# Patient Record
Sex: MALE | Race: WHITE | NOT HISPANIC OR LATINO | Employment: FULL TIME | URBAN - METROPOLITAN AREA
[De-identification: names, ages, dates, MRNs, and addresses within clinical notes are randomized per-mention and may not be internally consistent; named-entity substitution may affect disease eponyms.]

---

## 2017-11-14 ENCOUNTER — TELEPHONE (OUTPATIENT)
Dept: FAMILY MEDICINE CLINIC | Facility: CLINIC | Age: 47
End: 2017-11-14

## 2017-11-14 NOTE — TELEPHONE ENCOUNTER
----- Message from Cole Sanabria MD sent at 11/13/2017  1:35 PM EST -----  That's fine. Thx    ----- Message -----     From: Florence Mccauley     Sent: 11/13/2017   1:22 PM       To: Cole Sanabria MD    Pt would like to transfer to you from the [facility] location. His doctor retired.     Pt #: [phone]

## 2018-03-14 ENCOUNTER — OFFICE VISIT (OUTPATIENT)
Dept: FAMILY MEDICINE CLINIC | Facility: CLINIC | Age: 48
End: 2018-03-14

## 2018-03-14 VITALS
OXYGEN SATURATION: 97 % | TEMPERATURE: 98.5 F | HEART RATE: 84 BPM | SYSTOLIC BLOOD PRESSURE: 138 MMHG | DIASTOLIC BLOOD PRESSURE: 72 MMHG | BODY MASS INDEX: 37.08 KG/M2 | WEIGHT: 273.8 LBS | HEIGHT: 72 IN

## 2018-03-14 DIAGNOSIS — Z12.5 SCREENING PSA (PROSTATE SPECIFIC ANTIGEN): ICD-10-CM

## 2018-03-14 DIAGNOSIS — Z00.00 HEALTH CARE MAINTENANCE: Primary | ICD-10-CM

## 2018-03-14 PROBLEM — I47.1 SUPRAVENTRICULAR TACHYCARDIA (HCC): Status: ACTIVE | Noted: 2018-03-14

## 2018-03-14 PROBLEM — I47.10 SUPRAVENTRICULAR TACHYCARDIA: Status: ACTIVE | Noted: 2018-03-14

## 2018-03-14 PROBLEM — E66.9 OBESITY: Status: ACTIVE | Noted: 2018-03-14

## 2018-03-14 PROCEDURE — 99396 PREV VISIT EST AGE 40-64: CPT | Performed by: FAMILY MEDICINE

## 2018-03-14 RX ORDER — CETIRIZINE HYDROCHLORIDE 10 MG/1
10 TABLET ORAL DAILY
COMMUNITY
End: 2021-10-19

## 2018-03-14 RX ORDER — MAG HYDROX/ALUMINUM HYD/SIMETH 400-400-40
1 SUSPENSION, ORAL (FINAL DOSE FORM) ORAL DAILY
COMMUNITY
Start: 2015-11-04

## 2018-03-14 NOTE — PROGRESS NOTES
Subjective   Vinnie Ventura is a 48 y.o. male.     Chief Complaint   Patient presents with   • Establish Care     New pt  pt is not fasting   • Rapid Heart Rate   • Heartburn        History of Present Illness    New patient this practice.  Here to get established.  Also annual complete physical examination.    Quit smoking a few years ago.    No alcohol use.    Exercises daily by walking the dog.  He's been trying to lose some weight but is very busy with work and going to nursing school.    History SVT.  Ablation 2 years ago.  No trouble since.    GERD.  Long-standing.  Patient continues omeprazole.    Social History   Substance Use Topics   • Smoking status: Former Smoker     Packs/day: 1.50     Years: 13.00     Quit date: 05/2011   • Smokeless tobacco: Never Used   • Alcohol use No     Immunization History   Administered Date(s) Administered   • Tdap 02/24/2018     Family History   Problem Relation Age of Onset   • Hypertension Mother    • Heart disease Father    • Diabetes Father    • Hypertension Father    • Heart disease Maternal Grandmother    • Diabetes Maternal Grandmother    • Hypertension Maternal Grandmother    • Heart disease Maternal Grandfather    • Heart disease Paternal Grandmother    • Hypertension Paternal Grandmother    • Heart disease Paternal Grandfather    • Breast cancer Sister          The following portions of the patient's history were reviewed and updated as appropriate: allergies, current medications, past family history, past medical history, past social history, past surgical history and problem list.          Review of Systems   Constitutional: Negative.    HENT: Negative.    Respiratory: Negative.    Cardiovascular: Negative.    Gastrointestinal: Negative.  Negative for blood in stool.   Endocrine: Negative.    Genitourinary: Negative.  Negative for hematuria.   Musculoskeletal: Negative.    Skin: Negative.    Neurological: Negative.  Negative for headaches.   Psychiatric/Behavioral:  "Negative.    All other systems reviewed and are negative.      Objective   Blood pressure 138/72, pulse 84, temperature 98.5 °F (36.9 °C), temperature source Oral, height 182.9 cm (72.01\"), weight 124 kg (273 lb 12.8 oz), SpO2 97 %.  Physical Exam   Constitutional: He appears well-developed and well-nourished. No distress.   No acute distress.  Nontoxic.   HENT:   Right Ear: Tympanic membrane, external ear and ear canal normal.   Left Ear: Tympanic membrane, external ear and ear canal normal.   Nose: Nose normal.   Mouth/Throat: Oropharynx is clear and moist. No oropharyngeal exudate.   Eyes: Conjunctivae are normal. Right eye exhibits no discharge. Left eye exhibits no discharge. No scleral icterus.   Neck: No thyromegaly present.   Cardiovascular: Normal rate, regular rhythm, normal heart sounds and intact distal pulses.    Pulmonary/Chest: Effort normal and breath sounds normal. No stridor. No respiratory distress. He has no wheezes. He has no rales.   No tachypnea   Abdominal: Soft. Bowel sounds are normal. There is no tenderness.   Genitourinary:   Genitourinary Comments: Prostate +2 size.  Smooth.  Symmetric.  No nodules.  No masses.   Musculoskeletal: He exhibits no edema.   Lymphadenopathy:     He has no cervical adenopathy.   Skin: Skin is warm and dry. No rash noted.   Psychiatric: He has a normal mood and affect. His behavior is normal. Judgment and thought content normal.   Nursing note and vitals reviewed.      Assessment/Plan   Vinnie was seen today for establish care, rapid heart rate and heartburn.    Diagnoses and all orders for this visit:    Health care maintenance  -     CBC & Differential; Future  -     Comprehensive Metabolic Panel; Future  -     Lipid Panel; Future    Screening PSA (prostate specific antigen)  -     PSA Screen; Future      Annual complete physical examination.    Immunizations up-to-date..    Prostate cancer screening complete.  Benefits and limitations of PSA testing " discussed.    SVT.  History.  Resolved.  Ablation 2 years ago.  No recurrent symptoms.    GERD.  Uses omeprazole.    Diet and exercise recommended and discussed.    Patient states HIV testing up-to-date last year during life insurance physical.  Patient states not sexually active greater than 5 years.  No other risk factors.

## 2018-03-19 ENCOUNTER — RESULTS ENCOUNTER (OUTPATIENT)
Dept: FAMILY MEDICINE CLINIC | Facility: CLINIC | Age: 48
End: 2018-03-19

## 2018-03-19 DIAGNOSIS — Z00.00 HEALTH CARE MAINTENANCE: ICD-10-CM

## 2018-03-19 DIAGNOSIS — Z12.5 SCREENING PSA (PROSTATE SPECIFIC ANTIGEN): ICD-10-CM

## 2018-03-21 LAB
ALBUMIN SERPL-MCNC: 4.1 G/DL (ref 3.5–5.2)
ALBUMIN/GLOB SERPL: 1.4 G/DL
ALP SERPL-CCNC: 72 U/L (ref 39–117)
ALT SERPL-CCNC: 31 U/L (ref 1–41)
AST SERPL-CCNC: 24 U/L (ref 1–40)
BASOPHILS # BLD AUTO: 0.03 10*3/MM3 (ref 0–0.2)
BASOPHILS NFR BLD AUTO: 0.5 % (ref 0–1.5)
BILIRUB SERPL-MCNC: 0.3 MG/DL (ref 0.1–1.2)
BUN SERPL-MCNC: 15 MG/DL (ref 6–20)
BUN/CREAT SERPL: 17.4 (ref 7–25)
CALCIUM SERPL-MCNC: 8.9 MG/DL (ref 8.6–10.5)
CHLORIDE SERPL-SCNC: 100 MMOL/L (ref 98–107)
CHOLEST SERPL-MCNC: 191 MG/DL (ref 0–200)
CO2 SERPL-SCNC: 27.4 MMOL/L (ref 22–29)
CREAT SERPL-MCNC: 0.86 MG/DL (ref 0.76–1.27)
EOSINOPHIL # BLD AUTO: 0.15 10*3/MM3 (ref 0–0.7)
EOSINOPHIL NFR BLD AUTO: 2.3 % (ref 0.3–6.2)
ERYTHROCYTE [DISTWIDTH] IN BLOOD BY AUTOMATED COUNT: 13.5 % (ref 11.5–14.5)
GFR SERPLBLD CREATININE-BSD FMLA CKD-EPI: 116 ML/MIN/1.73
GFR SERPLBLD CREATININE-BSD FMLA CKD-EPI: 95 ML/MIN/1.73
GLOBULIN SER CALC-MCNC: 2.9 GM/DL
GLUCOSE SERPL-MCNC: 94 MG/DL (ref 65–99)
HCT VFR BLD AUTO: 46.4 % (ref 40.4–52.2)
HDLC SERPL-MCNC: 31 MG/DL (ref 40–60)
HGB BLD-MCNC: 15.3 G/DL (ref 13.7–17.6)
IMM GRANULOCYTES # BLD: 0.03 10*3/MM3 (ref 0–0.03)
IMM GRANULOCYTES NFR BLD: 0.5 % (ref 0–0.5)
LDLC SERPL CALC-MCNC: 102 MG/DL (ref 0–100)
LYMPHOCYTES # BLD AUTO: 2.23 10*3/MM3 (ref 0.9–4.8)
LYMPHOCYTES NFR BLD AUTO: 34.3 % (ref 19.6–45.3)
MCH RBC QN AUTO: 29.2 PG (ref 27–32.7)
MCHC RBC AUTO-ENTMCNC: 33 G/DL (ref 32.6–36.4)
MCV RBC AUTO: 88.5 FL (ref 79.8–96.2)
MONOCYTES # BLD AUTO: 0.78 10*3/MM3 (ref 0.2–1.2)
MONOCYTES NFR BLD AUTO: 12 % (ref 5–12)
NEUTROPHILS # BLD AUTO: 3.29 10*3/MM3 (ref 1.9–8.1)
NEUTROPHILS NFR BLD AUTO: 50.4 % (ref 42.7–76)
PLATELET # BLD AUTO: 199 10*3/MM3 (ref 140–500)
POTASSIUM SERPL-SCNC: 4.3 MMOL/L (ref 3.5–5.2)
PROT SERPL-MCNC: 7 G/DL (ref 6–8.5)
PSA SERPL-MCNC: 0.72 NG/ML (ref 0–4)
RBC # BLD AUTO: 5.24 10*6/MM3 (ref 4.6–6)
SODIUM SERPL-SCNC: 140 MMOL/L (ref 136–145)
TRIGL SERPL-MCNC: 290 MG/DL (ref 0–150)
VLDLC SERPL CALC-MCNC: 58 MG/DL (ref 5–40)
WBC # BLD AUTO: 6.51 10*3/MM3 (ref 4.5–10.7)

## 2018-03-22 NOTE — PROGRESS NOTES
Triglycerides are elevated, HDL good cholesterol is low, I recommend increased exercise.  Otherwise lab work looks good.  PSA test within normal limits.

## 2018-12-04 ENCOUNTER — TELEPHONE (OUTPATIENT)
Dept: FAMILY MEDICINE CLINIC | Facility: CLINIC | Age: 48
End: 2018-12-04

## 2018-12-04 DIAGNOSIS — Z00.00 HEALTH CARE MAINTENANCE: Primary | ICD-10-CM

## 2018-12-04 NOTE — TELEPHONE ENCOUNTER
Pt called needing to get a TB test done this week, do you have a open slot for him to be able to come in. Dr. Sanabria is booked until the last week of December and pt said that would be to late. He is needing it for work and school.

## 2018-12-04 NOTE — TELEPHONE ENCOUNTER
Pt does not have to be seen. He just needs to come in and have lab work done. Please call him back and schedule him for an lab apt.  Thanks

## 2018-12-08 LAB
GAMMA INTERFERON BACKGROUND BLD IA-ACNC: 0.03 IU/ML
M TB IFN-G BLD-IMP: NEGATIVE
M TB IFN-G CD4+ BCKGRND COR BLD-ACNC: 0.06 IU/ML
MITOGEN IGNF BLD-ACNC: >10 IU/ML
QUANTIFERON INCUBATION: NORMAL
QUANTIFERON TB2 AG VALUE: 0.06 IU/ML
SERVICE CMNT-IMP: NORMAL

## 2019-12-18 ENCOUNTER — TELEPHONE (OUTPATIENT)
Dept: FAMILY MEDICINE CLINIC | Facility: CLINIC | Age: 49
End: 2019-12-18

## 2019-12-18 DIAGNOSIS — Z00.00 HEALTH CARE MAINTENANCE: Primary | ICD-10-CM

## 2019-12-18 NOTE — TELEPHONE ENCOUNTER
That is fine.  Okay to order the QuantiFERON test.  However please asked him to make a appointment for a annual physical exam sometime in 2020.

## 2019-12-18 NOTE — TELEPHONE ENCOUNTER
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 Please let patient know if this can be placed by Dr. Sanabria.

## 2019-12-18 NOTE — TELEPHONE ENCOUNTER
Blood test was order for the TB test. Please call pt and tell him that he needs to set up an apt for labs and a physical in 2020.

## 2019-12-22 LAB
GAMMA INTERFERON BACKGROUND BLD IA-ACNC: 0.05 IU/ML
M TB IFN-G BLD-IMP: NEGATIVE
M TB IFN-G CD4+ BCKGRND COR BLD-ACNC: 0.05 IU/ML
MITOGEN IGNF BLD-ACNC: >10 IU/ML
QUANTIFERON INCUBATION: NORMAL
QUANTIFERON TB2 AG VALUE: 0.04 IU/ML
SERVICE CMNT-IMP: NORMAL

## 2020-01-20 ENCOUNTER — OFFICE VISIT (OUTPATIENT)
Dept: FAMILY MEDICINE CLINIC | Facility: CLINIC | Age: 50
End: 2020-01-20

## 2020-01-20 VITALS
WEIGHT: 294.5 LBS | HEART RATE: 72 BPM | HEIGHT: 72 IN | TEMPERATURE: 97.3 F | OXYGEN SATURATION: 97 % | SYSTOLIC BLOOD PRESSURE: 157 MMHG | BODY MASS INDEX: 39.89 KG/M2 | DIASTOLIC BLOOD PRESSURE: 97 MMHG

## 2020-01-20 DIAGNOSIS — R53.83 FATIGUE, UNSPECIFIED TYPE: ICD-10-CM

## 2020-01-20 DIAGNOSIS — I10 ESSENTIAL HYPERTENSION: ICD-10-CM

## 2020-01-20 DIAGNOSIS — Z12.5 SCREENING PSA (PROSTATE SPECIFIC ANTIGEN): ICD-10-CM

## 2020-01-20 DIAGNOSIS — Z00.00 HEALTH CARE MAINTENANCE: Primary | ICD-10-CM

## 2020-01-20 PROCEDURE — 99396 PREV VISIT EST AGE 40-64: CPT | Performed by: FAMILY MEDICINE

## 2020-01-20 RX ORDER — LISINOPRIL 10 MG/1
10 TABLET ORAL DAILY
Qty: 90 TABLET | Refills: 1 | Status: SHIPPED | OUTPATIENT
Start: 2020-01-20 | End: 2020-04-15

## 2020-01-20 NOTE — PROGRESS NOTES
Subjective   Vinnie Ventura is a 49 y.o. male.     Chief Complaint   Patient presents with   • Annual Exam        History of Present Illness     Here for annual wellness visit.  He is not getting much exercise other than walking around at work.  No alcohol use.  Quit smoking some years ago.  He states his blood pressures been high at work.  Running about 130s 140s over 70s.  No cardiovascular notes.  No headaches.  No chest pain.  No recurrent SVT.  Status post ablation some years ago.  He states he does snore sometimes but he does not have trouble with sleep apnea as far as he knows.  No witnessed apnea.  He has felt some fatigue at times.  No depression.  His tuberculosis screen was negative.  No recent lab work.      Social History     Tobacco Use   • Smoking status: Former Smoker     Packs/day: 1.50     Years: 13.00     Pack years: 19.50     Last attempt to quit: 2011     Years since quittin.7   • Smokeless tobacco: Never Used   Substance Use Topics   • Alcohol use: No   • Drug use: No     Immunization History   Administered Date(s) Administered   • Hepatitis A 2018, 10/22/2018   • Influenza, Unspecified 2018, 2019   • MMR 09/10/2019   • Tdap 2018     Family History   Problem Relation Age of Onset   • Hypertension Mother    • Heart disease Father    • Diabetes Father    • Hypertension Father    • Heart disease Maternal Grandmother    • Diabetes Maternal Grandmother    • Hypertension Maternal Grandmother    • Heart disease Maternal Grandfather    • Heart disease Paternal Grandmother    • Hypertension Paternal Grandmother    • Heart disease Paternal Grandfather    • Breast cancer Sister          The following portions of the patient's history were reviewed and updated as appropriate: allergies, current medications, past family history, past medical history, past social history, past surgical history and problem list.          Review of Systems   Constitutional: Positive for fatigue.  "  HENT: Negative.    Respiratory: Negative.    Cardiovascular: Negative.    Gastrointestinal: Negative.  Negative for blood in stool.        No dysphagia.  No severe acid reflux symptoms.   Endocrine: Negative.    Genitourinary: Negative.  Negative for hematuria.   Musculoskeletal: Negative.    Skin: Negative.    Neurological: Negative.    Psychiatric/Behavioral: Negative.    All other systems reviewed and are negative.      Objective   Blood pressure 157/97, pulse 72, temperature 97.3 °F (36.3 °C), temperature source Oral, height 182.9 cm (72.01\"), weight 134 kg (294 lb 8 oz), SpO2 97 %.  Physical Exam   Constitutional: He is oriented to person, place, and time. He appears well-developed and well-nourished. No distress.   HENT:   Head: Atraumatic.   Right Ear: Tympanic membrane, external ear and ear canal normal.   Left Ear: Tympanic membrane, external ear and ear canal normal.   Nose: Nose normal.   Mouth/Throat: Oropharynx is clear and moist. No oropharyngeal exudate.   Eyes: Conjunctivae are normal. Right eye exhibits no discharge. Left eye exhibits no discharge. No scleral icterus.   Neck: Normal range of motion. Neck supple. No thyromegaly present.   Cardiovascular: Normal rate, regular rhythm, normal heart sounds and intact distal pulses.   Pulmonary/Chest: Effort normal and breath sounds normal. No respiratory distress. He has no wheezes. He has no rales.   Abdominal: Soft. Bowel sounds are normal. He exhibits no distension. There is no tenderness.   Musculoskeletal: He exhibits no edema.   Lymphadenopathy:     He has no cervical adenopathy.   Neurological: He is alert and oriented to person, place, and time. He exhibits normal muscle tone. Coordination normal.   Skin: Skin is warm and dry. No rash noted.   Psychiatric: He has a normal mood and affect. His behavior is normal. Judgment and thought content normal.   Nursing note and vitals reviewed.      Assessment/Plan   Vinnie was seen today for annual " exam.    Diagnoses and all orders for this visit:    Health care maintenance  -     CBC & Differential  -     Comprehensive Metabolic Panel  -     Lipid Panel  -     PSA Screen  -     TSH Rfx On Abnormal To Free T4    Screening PSA (prostate specific antigen)  -     PSA Screen    Fatigue, unspecified type  -     TSH Rfx On Abnormal To Free T4    Essential hypertension    Other orders  -     lisinopril (PRINIVIL,ZESTRIL) 10 MG tablet; Take 1 tablet by mouth Daily.        Annual wellness visit.    Immunizations.  Up-to-date through work.    Prostate cancer screening.  Patient declined prostate examination today.  Checking PSA.  Normal last check a year and a half ago.    Fatigue.  Likely likely multifactorial.  We did discuss possible sleep apnea.  But patient states he has been observed sleeping and no trouble with apnea or severe snoring.  Checking above lab work.  If symptoms persist to consider sleep study.  He has no hypogonadal symptoms such as loss of body hair or gynecomastia.    Essential hypertension.  New diagnosis.  Lab work ordered today.  Starting lisinopril 10 mg a day.  Check blood pressure readings at home.  See me in 3 months for follow-up.    Preventive health practices discussed including regular exercise and mindfulness meditation.

## 2020-01-21 LAB
ALBUMIN SERPL-MCNC: 4.4 G/DL (ref 3.5–5.2)
ALBUMIN/GLOB SERPL: 1.8 G/DL
ALP SERPL-CCNC: 73 U/L (ref 39–117)
ALT SERPL-CCNC: 41 U/L (ref 1–41)
AST SERPL-CCNC: 27 U/L (ref 1–40)
BASOPHILS # BLD AUTO: 0.03 10*3/MM3 (ref 0–0.2)
BASOPHILS NFR BLD AUTO: 0.5 % (ref 0–1.5)
BILIRUB SERPL-MCNC: 0.2 MG/DL (ref 0.2–1.2)
BUN SERPL-MCNC: 13 MG/DL (ref 6–20)
BUN/CREAT SERPL: 14.3 (ref 7–25)
CALCIUM SERPL-MCNC: 8.8 MG/DL (ref 8.6–10.5)
CHLORIDE SERPL-SCNC: 99 MMOL/L (ref 98–107)
CHOLEST SERPL-MCNC: 209 MG/DL (ref 0–200)
CO2 SERPL-SCNC: 25 MMOL/L (ref 22–29)
CREAT SERPL-MCNC: 0.91 MG/DL (ref 0.76–1.27)
EOSINOPHIL # BLD AUTO: 0.13 10*3/MM3 (ref 0–0.4)
EOSINOPHIL NFR BLD AUTO: 2.2 % (ref 0.3–6.2)
ERYTHROCYTE [DISTWIDTH] IN BLOOD BY AUTOMATED COUNT: 13.6 % (ref 12.3–15.4)
GLOBULIN SER CALC-MCNC: 2.5 GM/DL
GLUCOSE SERPL-MCNC: 98 MG/DL (ref 65–99)
HCT VFR BLD AUTO: 46.9 % (ref 37.5–51)
HDLC SERPL-MCNC: 29 MG/DL (ref 40–60)
HGB BLD-MCNC: 16.1 G/DL (ref 13–17.7)
IMM GRANULOCYTES # BLD AUTO: 0.02 10*3/MM3 (ref 0–0.05)
IMM GRANULOCYTES NFR BLD AUTO: 0.3 % (ref 0–0.5)
LDLC SERPL CALC-MCNC: 102 MG/DL (ref 0–100)
LYMPHOCYTES # BLD AUTO: 1.69 10*3/MM3 (ref 0.7–3.1)
LYMPHOCYTES NFR BLD AUTO: 28.9 % (ref 19.6–45.3)
MCH RBC QN AUTO: 30 PG (ref 26.6–33)
MCHC RBC AUTO-ENTMCNC: 34.3 G/DL (ref 31.5–35.7)
MCV RBC AUTO: 87.3 FL (ref 79–97)
MONOCYTES # BLD AUTO: 0.61 10*3/MM3 (ref 0.1–0.9)
MONOCYTES NFR BLD AUTO: 10.4 % (ref 5–12)
NEUTROPHILS # BLD AUTO: 3.37 10*3/MM3 (ref 1.7–7)
NEUTROPHILS NFR BLD AUTO: 57.7 % (ref 42.7–76)
NRBC BLD AUTO-RTO: 0 /100 WBC (ref 0–0.2)
PLATELET # BLD AUTO: 224 10*3/MM3 (ref 140–450)
POTASSIUM SERPL-SCNC: 4.3 MMOL/L (ref 3.5–5.2)
PROT SERPL-MCNC: 6.9 G/DL (ref 6–8.5)
PSA SERPL-MCNC: 0.85 NG/ML (ref 0–4)
RBC # BLD AUTO: 5.37 10*6/MM3 (ref 4.14–5.8)
SODIUM SERPL-SCNC: 138 MMOL/L (ref 136–145)
TRIGL SERPL-MCNC: 389 MG/DL (ref 0–150)
TSH SERPL DL<=0.005 MIU/L-ACNC: 2.19 UIU/ML (ref 0.27–4.2)
VLDLC SERPL CALC-MCNC: 77.8 MG/DL
WBC # BLD AUTO: 5.85 10*3/MM3 (ref 3.4–10.8)

## 2020-01-21 NOTE — PROGRESS NOTES
Triglycerides are elevated.  HDL good cholesterol is low.  Increased exercise recommended along with weight loss.  Remainder the blood work looked within normal limits.

## 2020-03-13 DIAGNOSIS — R06.83 SNORING: Primary | ICD-10-CM

## 2020-03-30 ENCOUNTER — APPOINTMENT (OUTPATIENT)
Dept: SLEEP MEDICINE | Facility: HOSPITAL | Age: 50
End: 2020-03-30

## 2020-04-15 ENCOUNTER — TELEMEDICINE (OUTPATIENT)
Dept: FAMILY MEDICINE CLINIC | Facility: CLINIC | Age: 50
End: 2020-04-15

## 2020-04-15 DIAGNOSIS — I10 ESSENTIAL HYPERTENSION: Primary | ICD-10-CM

## 2020-04-15 PROCEDURE — 99213 OFFICE O/P EST LOW 20 MIN: CPT | Performed by: FAMILY MEDICINE

## 2020-04-15 RX ORDER — LISINOPRIL 10 MG/1
10 TABLET ORAL DAILY
Qty: 90 TABLET | Refills: 1 | Status: SHIPPED | OUTPATIENT
Start: 2020-04-15 | End: 2020-07-17

## 2020-04-15 NOTE — PROGRESS NOTES
Subjective   Vinnie Ventura is a 50 y.o. male.     Chief Complaint   Patient presents with   • Hypertension        History of Present Illness    Coronavirus pandemic telehealth visit.  Excellent audio and video.    You have chosen to receive care through a telehealth visit.  Do you consent to use a video/audio connection for your medical care today? Yes    Follow-up hypertension.  Diagnosed about 3 months ago.  New diagnosis.  Start on lisinopril 10 mg a day.  Since taking the medication he states he feels overall better.  He was having some headaches before and has improved.  He has no dry cough or other side effects.  He is taking medication as indicated.  He is checking his blood pressure both at home at work.  Is read about 120/70 systolic.  He has no questions about his health today.  He did have some fatigue and trouble with snoring.  He was referred to sleep specialty for sleep apnea evaluation.  That is been on hold because of the coronavirus epidemic.    The following portions of the patient's history were reviewed and updated as appropriate: allergies, current medications, past family history, past medical history, past social history, past surgical history and problem list.          Review of Systems   Constitutional: Negative.    Respiratory: Negative.    Cardiovascular: Negative.        Objective   There were no vitals taken for this visit.  Physical Exam   Constitutional: He is oriented to person, place, and time. No distress.   Patient is well lit with a good video connection.  He appears to be in no acute distress.  Good coloration.   HENT:   Head: Atraumatic.   Neck: Normal range of motion.   Pulmonary/Chest: Effort normal. No respiratory distress.   Neurological: He is alert and oriented to person, place, and time. Coordination normal.   Psychiatric: He has a normal mood and affect. His behavior is normal.       Assessment/Plan   Vinnie was seen today for hypertension.    Diagnoses and all orders  for this visit:    Essential hypertension      Essential hypertension.  Recently diagnosed.  Doing well on lisinopril 10 mg a day with no side effects.  Feeling better.  Blood pressures are well controlled.  Continue lisinopril 10 mg a day.  Refills given.  I will see him back in January 2021 for annual wellness visit.  He will follow-up sooner as needed.  We did go over his blood work from January.  His triglycerides were somewhat elevated.  I recommended exercise and losing weight.    Total duration of telehealth visit approximately 15 minutes.

## 2020-05-12 ENCOUNTER — OFFICE VISIT (OUTPATIENT)
Dept: SLEEP MEDICINE | Facility: HOSPITAL | Age: 50
End: 2020-05-12

## 2020-05-12 VITALS
HEART RATE: 82 BPM | DIASTOLIC BLOOD PRESSURE: 86 MMHG | SYSTOLIC BLOOD PRESSURE: 140 MMHG | BODY MASS INDEX: 40.5 KG/M2 | HEIGHT: 72 IN | OXYGEN SATURATION: 97 % | WEIGHT: 299 LBS

## 2020-05-12 DIAGNOSIS — G47.33 OSA (OBSTRUCTIVE SLEEP APNEA): Primary | ICD-10-CM

## 2020-05-12 PROCEDURE — G0463 HOSPITAL OUTPT CLINIC VISIT: HCPCS

## 2020-05-12 NOTE — PROGRESS NOTES
"            [facility]      Patient Care Team:  Cole Sanabria MD as PCP - General (Family Medicine)  Dolores Stubbs MD (Inactive) as PCP - Family Medicine    Referring Provider: Cole Sanabria MD    Chief complaint:   Referred for sleep apnea evaluation    History of present illness:  Subjective   This is a 50 year male patient with hx of GERD and SVT and recently diagnosed HTN (started Lisinopril therapy in January).     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    He stated that he was previously referred for evaluation of sleep apnea and went to a sleep center once but did not pursue testing. He did nit feel comfortable at that place.    He reported loud snoring which awakens him at night and disturb his bed partners.  He currently sleeps alone though.  He does awaken himself gasping for breath and he was told that he stops breathing and chokes at night.  He also wakes up occasionally with headache, about once every 2 to 3 weeks.  He feels so tired and drained during the day no matter how many hours of sleep he gets even if he gets 14-16 hours.  He describes restless sleep, leg jerks and frequent awakening.    Obesity:  He gained about 80 lb in 2 years.  Neck collar: 17.5\"-18\"  He walks over a mile a day.  He does not have any special diet.    Sleep schedule:  -Bedtime: Midnight-1 AM on the off days and 8 AM on the working days  -Sleep latency: Not too long  -Wake up time: 4:30 PM on the working days and up to noon on the off days, does not feel refreshed  -Nocturnal awakenin-3 times because of nocturia.  No difficulties going back to sleep.  -Perceived sleep hours: 7-8 on the working days and 12-14 hours on the off days    ESS: Total score: 18.  He denies sleep assess, central cataplexy    Review of Systems  Constitutional: No fever or chills. No changes in appetite.   ENMT: No sinus congestion, postnasal drip, hoarsness  Cardiovascular: No " chest pain, palpitation or legs swelling.    Respiratory: No dyspnea, cough or wheezing.  Gastrointestinal: No constipation, diarrhea, abdominal pain or acid reflux  Neurology: No headache, weakness, numbness or dizziness.   Musculoskeletal: No joints pain, stiffness or swelling.   Psychiatry: No depression, anxiety or irritability.   Hem/Lymphatic: No swollen glands or easy bruising.  Integumentary: No rash.  Endocrinology: No excessive thirst, cold or warm intolerance.   Urinary: No dysuria, bloody urine or frequent urination.     History  Past Medical History:   Diagnosis Date   • Encounter for special screening examination for neoplasm of prostate     > 5 yrs ago   • Eye exam normal     > 5 yrs ago   • GERD (gastroesophageal reflux disease)    • SVT (supraventricular tachycardia) (CMS/HCC)    ,   Past Surgical History:   Procedure Laterality Date   • CARDIAC ABLATION  01/15/2016   • CARPAL TUNNEL RELEASE Bilateral ,     [facility]   • CHOLECYSTECTOMY      [facility]   ,   Family History   Problem Relation Age of Onset   • Hypertension Mother    • Heart disease Father    • Diabetes Father    • Hypertension Father    • Heart disease Maternal Grandmother    • Diabetes Maternal Grandmother    • Hypertension Maternal Grandmother    • Heart disease Maternal Grandfather    • Heart disease Paternal Grandmother    • Hypertension Paternal Grandmother    • Heart disease Paternal Grandfather    • Breast cancer Sister    ,   Social History     Tobacco Use   • Smoking status: Former Smoker     Packs/day: 1.50     Years: 13.00     Pack years: 19.50     Last attempt to quit: 2011     Years since quittin.0   • Smokeless tobacco: Never Used   Substance Use Topics   • Alcohol use: No   • Drug use: No    and Allergies:  Patient has no known allergies.    Medications:    Current Outpatient Medications:   •  calcium-vitamin D (OSCAL-500) 500-200 MG-UNIT per tablet, Take 1 tablet by mouth daily., Disp: ,  "Rfl:   •  cetirizine (zyrTEC) 10 MG tablet, Take 10 mg by mouth Daily., Disp: , Rfl:   •  GARLIC 1500 PO, Take 1 tablet by mouth Daily., Disp: , Rfl:   •  lisinopril (PRINIVIL,ZESTRIL) 10 MG tablet, TAKE 1 TABLET BY MOUTH DAILY, Disp: 90 tablet, Rfl: 1  •  Lycopene 10 MG capsule, Take 1 tablet by mouth Daily., Disp: , Rfl:   •  Misc Natural Products (LUTEIN 20 PO), Take 1 tablet by mouth Daily., Disp: , Rfl:   •  Multiple Vitamins-Minerals (MULTIVITAMIN WITH MINERALS) tablet, Take 1 tablet by mouth daily., Disp: , Rfl:   •  Omega-3 Fatty Acids (FISH OIL) 1000 MG capsule, Take by mouth., Disp: , Rfl:   •  omeprazole (PriLOSEC) 20 MG capsule, Take 20 mg by mouth daily., Disp: , Rfl:   •  Saw Palmetto 450 MG capsule, Take 1 tablet by mouth Daily., Disp: , Rfl:   •  SUPER B COMPLEX/C PO, Take 1 tablet by mouth Daily., Disp: , Rfl:       Objective   Vital Signs:  Vitals:    05/12/20 1417   BP: 140/86   Pulse: 82   SpO2: 97%   Weight: 136 kg (299 lb)   Height: 182.9 cm (72\")     Body mass index is 40.55 kg/m².  Neck Circumference: 18 inches     Physical Exam:  Neck Circumference: 18 inches     Constitutional: Not in acute distress.  Eyes: Injected conjunctiva, EOMI. pupils equal reactive to light.  ENMT: Hood score 3. Mallampati score 3. No oral thrush. Tonsils grade 1. Narrow distance in between the posterior pharyngeal pillars (<25 %). Large tongue.  Neck: Large. No thyromegaly.  Trachea midline.  Heart: Regular rhythm and rate, no murmur  Lungs: Good and equal air entry bilaterally. No crackles or wheezing.  Nonlabored breathing.       Abdomen: Obese.  Soft.  No tenderness.  Positive bowel sounds.  Extremities: No cyanosis, clubbing or pitting edema.  Warm extremities and well-perfused.  Neuro: Conscious, alert, oriented x3.  Gait is normal.  Strength 5/5 in arms and legs.  Psych: Appropriate mood and affect.    Integumentary: No rash.  Normal skin turgor.  Lymphatic: No palpable cervical or supraclavicular lymph " nodes.        Assessment   1. Snoring, likely NAV  2. Morbid obesity, (BMI=40)  3. Essential hypertension  4. Hypersomnia, unspecified, likely related to untreated sleep apnea and possibly shiftwork disorder    Plan:  Check HST. We discussed the pathophysiology of sleep apnea, testing and therapy which include CPAP and weight loss.  Patient is agreeable with CPAP therapy if needed.     Counseled for weight loss.  Encouraged to exercise regularly and cut down on carbohydrates.  Discussed that losing weight may decrease the severity of sleep apnea and obviate the need of CPAP therapy.    Discussed the situation between obstructive sleep apnea cardiovascular disease including hypertension and the beneficial effect of Pap therapy        Jose Almonte MD  05/12/20  14:29    This note was dictated utilizing Dragon dictation

## 2020-05-19 ENCOUNTER — HOSPITAL ENCOUNTER (OUTPATIENT)
Dept: SLEEP MEDICINE | Facility: HOSPITAL | Age: 50
Discharge: HOME | End: 2020-05-19
Admitting: INTERNAL MEDICINE

## 2020-05-19 DIAGNOSIS — G47.33 OSA (OBSTRUCTIVE SLEEP APNEA): ICD-10-CM

## 2020-05-19 PROCEDURE — 95806 SLEEP STUDY UNATT&RESP EFFT: CPT

## 2020-05-26 ENCOUNTER — TELEPHONE (OUTPATIENT)
Dept: SLEEP MEDICINE | Facility: HOSPITAL | Age: 50
End: 2020-05-26

## 2020-06-29 ENCOUNTER — LAB (OUTPATIENT)
Dept: LAB | Facility: HOSPITAL | Age: 50
End: 2020-06-29

## 2020-06-29 ENCOUNTER — TRANSCRIBE ORDERS (OUTPATIENT)
Dept: SLEEP MEDICINE | Facility: HOSPITAL | Age: 50
End: 2020-06-29

## 2020-06-29 DIAGNOSIS — Z01.818 OTHER SPECIFIED PRE-OPERATIVE EXAMINATION: Primary | ICD-10-CM

## 2020-06-29 DIAGNOSIS — Z01.818 OTHER SPECIFIED PRE-OPERATIVE EXAMINATION: ICD-10-CM

## 2020-06-29 PROCEDURE — U0004 COV-19 TEST NON-CDC HGH THRU: HCPCS

## 2020-06-29 PROCEDURE — C9803 HOPD COVID-19 SPEC COLLECT: HCPCS

## 2020-06-30 LAB
REF LAB TEST METHOD: NORMAL
SARS-COV-2 RNA RESP QL NAA+PROBE: NOT DETECTED

## 2020-07-03 ENCOUNTER — TELEPHONE (OUTPATIENT)
Dept: SLEEP MEDICINE | Facility: HOSPITAL | Age: 50
End: 2020-07-03

## 2020-07-03 NOTE — TELEPHONE ENCOUNTER
Pt had to reschedule his follow up appointment cause he had not obtained his CPAP supplies from [facility] due to lack of communication.  He asked to switch his order to [facility] and follow up was rescheduled for 8/31/2020 at 8am.

## 2020-07-06 ENCOUNTER — APPOINTMENT (OUTPATIENT)
Dept: SLEEP MEDICINE | Facility: HOSPITAL | Age: 50
End: 2020-07-06

## 2020-07-17 RX ORDER — LISINOPRIL 10 MG/1
10 TABLET ORAL DAILY
Qty: 90 TABLET | Refills: 1 | Status: SHIPPED | OUTPATIENT
Start: 2020-07-17 | End: 2021-01-11

## 2020-07-28 RX ORDER — OMEPRAZOLE 20 MG/1
20 CAPSULE, DELAYED RELEASE ORAL DAILY
Qty: 90 CAPSULE | Refills: 1 | Status: SHIPPED | OUTPATIENT
Start: 2020-07-28 | End: 2021-01-18 | Stop reason: SDUPTHER

## 2020-08-31 ENCOUNTER — OFFICE VISIT (OUTPATIENT)
Dept: SLEEP MEDICINE | Facility: HOSPITAL | Age: 50
End: 2020-08-31

## 2020-08-31 VITALS
HEART RATE: 78 BPM | OXYGEN SATURATION: 98 % | BODY MASS INDEX: 39.5 KG/M2 | SYSTOLIC BLOOD PRESSURE: 134 MMHG | WEIGHT: 291.6 LBS | HEIGHT: 72 IN | DIASTOLIC BLOOD PRESSURE: 87 MMHG

## 2020-08-31 DIAGNOSIS — G47.33 OBSTRUCTIVE SLEEP APNEA, ADULT: Primary | ICD-10-CM

## 2020-08-31 PROCEDURE — G0463 HOSPITAL OUTPT CLINIC VISIT: HCPCS

## 2020-08-31 NOTE — PROGRESS NOTES
[facility]                          Chief Complaint:   Follow up for obstructive sleep apnea, HTN and obesity    History of present illness:   Subjective   Summary:   Patient is a 50 y.o. male patient with hx of GERD and SVT and recently diagnosed HTN who complains of loud snoring, non-refreshing sleep, nocturia, morning headache and excessive daytime sleepiness.    Sleep schedule:  -Bedtime: 8-9AM  -Sleep latency: Not too long  -Wake up time: 2 PM , does feel refreshed  -Nocturnal awakenin-5 times because of adjusting the mask.  No difficulties going back to sleep.    Mask: DreamWear FFM which does not fit well sometimes.  No significant air leak or dry mouth  DME: [employer]    ESS: Total score: 7     NAV:  HST 20: AMARIS=46/h; Reported feeling better with CPAP., waking up less at night and more refreshed during the day. Taking less naps.     Hypertension:  Taking lisinopril regularly.  Denies side effects.  Blood pressure is stable.    REVIEW OF SYSTEMS:   HEENT: No nasal congestion or postnasal drip   CARDIOVASCULAR: No chest pain, chest pressure or chest discomfort. No palpitations or edema.   RESPIRATORY: No shortness of breath, cough or sputum.   GASTROINTESTINAL: No abdominal bloating or reflux   NEUROLOGICAL/PSYCHOATRY: No depression nor anxiety    Past Medical History:  Past Medical History:   Diagnosis Date   • Encounter for special screening examination for neoplasm of prostate     > 5 yrs ago   • Eye exam normal     > 5 yrs ago   • GERD (gastroesophageal reflux disease)    • SVT (supraventricular tachycardia) (CMS/HCC)    ,   Past Surgical History:   Procedure Laterality Date   • CARDIAC ABLATION  01/15/2016   • CARPAL TUNNEL RELEASE Bilateral ,     [facility]   • CHOLECYSTECTOMY      Onel Trevino   ,   Social History     Tobacco Use   • Smoking status: Former Smoker     Packs/day: 1.50     Years: 13.00     Pack  "years: 19.50     Last attempt to quit: 2011     Years since quittin.3   • Smokeless tobacco: Never Used   Substance Use Topics   • Alcohol use: No   • Drug use: No    and Allergies:  Patient has no known allergies.    Medication Review:     Current Outpatient Medications:   •  calcium-vitamin D (OSCAL-500) 500-200 MG-UNIT per tablet, Take 1 tablet by mouth daily., Disp: , Rfl:   •  cetirizine (zyrTEC) 10 MG tablet, Take 10 mg by mouth Daily., Disp: , Rfl:   •  GARLIC 1500 PO, Take 1 tablet by mouth Daily., Disp: , Rfl:   •  lisinopril (PRINIVIL,ZESTRIL) 10 MG tablet, TAKE 1 TABLET BY MOUTH DAILY, Disp: 90 tablet, Rfl: 1  •  Lycopene 10 MG capsule, Take 1 tablet by mouth Daily., Disp: , Rfl:   •  Misc Natural Products (LUTEIN 20 PO), Take 1 tablet by mouth Daily., Disp: , Rfl:   •  Multiple Vitamins-Minerals (MULTIVITAMIN WITH MINERALS) tablet, Take 1 tablet by mouth daily., Disp: , Rfl:   •  Omega-3 Fatty Acids (FISH OIL) 1000 MG capsule, Take by mouth., Disp: , Rfl:   •  omeprazole (priLOSEC) 20 MG capsule, Take 1 capsule by mouth Daily., Disp: 90 capsule, Rfl: 1  •  Saw Palmetto 450 MG capsule, Take 1 tablet by mouth Daily., Disp: , Rfl:   •  SUPER B COMPLEX/C PO, Take 1 tablet by mouth Daily., Disp: , Rfl:         Objective   Vital Signs:  Vitals:    20 0819   BP: 134/87   Pulse: 78   SpO2: 98%   Weight: 132 kg (291 lb 9.6 oz)   Height: 182.9 cm (72\")     Body mass index is 39.55 kg/m².          Physical Exam:   General Appearance:    Alert, cooperative, in no acute distress   ENMT:   Hood score 3. Mallampati score 3. No oral thrush. Tonsils grade 1. Narrow distance in between the posterior pharyngeal pillars (<25 %). Large tongue   Neck:  Large. Trachea midline. No thyromegaly.   Lungs:     Clear to auscultation,respirations regular, even and                  unlabored    Heart:    Regular rhythm and normal rate, normal S1 and S2, no            Murmur.   Abdomen:     Obese.  Soft.  No " tenderness.  No HSM    Neuro:   Conscious, alert, oriented x3. Appropriate mood and affect.    Extremities:   Moves all extremities well, no edema, no cyanosis, no             Redness              Diagnostic data:    HST was performed on: 5/20/2020  AMARIS= 46/h; Supine AMARIS: 31/h;   ADAN: 50/h; Trav SpO2: 66 %; Hypoxic burden: 59.5 minutes     CPAP download showed:  Date: Last 21 days  Usage (days): 100 %  Days used>4h: 71 %  AHI: 4/h  Leak: 24   Usage: 5 h and 17 min  P 90% : 10  Auto CPAP: 6-18 cm H2O    Assessment   1. NAV, severe, on CPAP  2. Essential HTN  3. Obesity (BMI = 39)        PLAN:  Discussed the result of the download.   Patient is compliant with therapy and clinically benefit from treatment.  Patient was encouraged to continue using his CPAP.  Refill supplies.    Counseled for weight loss.  Encouraged to exercise regularly and cut down on carbohydrates.  Discussed that losing weight may decrease the severity of sleep apnea and obviate the need of CPAP therapy.    Continue blood pressure meds.  Discussed the importance of sleep apnea therapy in the setting of HTN.                This note was dictated utilizing [employer] dictation

## 2020-09-14 ENCOUNTER — OFFICE VISIT (OUTPATIENT)
Dept: FAMILY MEDICINE CLINIC | Facility: CLINIC | Age: 50
End: 2020-09-14

## 2020-09-14 VITALS
HEART RATE: 80 BPM | SYSTOLIC BLOOD PRESSURE: 128 MMHG | DIASTOLIC BLOOD PRESSURE: 86 MMHG | BODY MASS INDEX: 39.55 KG/M2 | TEMPERATURE: 97.5 F | HEIGHT: 72 IN | OXYGEN SATURATION: 97 % | WEIGHT: 292 LBS

## 2020-09-14 DIAGNOSIS — M54.50 ACUTE BILATERAL LOW BACK PAIN WITHOUT SCIATICA: Primary | ICD-10-CM

## 2020-09-14 PROCEDURE — 99213 OFFICE O/P EST LOW 20 MIN: CPT | Performed by: FAMILY MEDICINE

## 2020-09-14 NOTE — PATIENT INSTRUCTIONS
Week or so if not 100% better in 1 month please let us know.  I would then want to consider an MRI.  Aleve 2 tablets twice a day through the end of the week and then after that as needed.  We can also consider physical therapy in the next couple weeks if not improving.

## 2020-09-14 NOTE — PROGRESS NOTES
Subjective   Vinnie Ventura is a 50 y.o. male.     Chief Complaint   Patient presents with   • Back Pain     lower back pain, x 4 wks, pt states tingling in right hip         Back Pain  This is a recurrent problem. The current episode started 1 to 4 weeks ago. The problem occurs intermittently. The problem has been gradually worsening since onset. The pain is present in the gluteal, lumbar spine and sacro-iliac. The quality of the pain is described as aching, burning and shooting. The pain radiates to the right thigh. The pain is at a severity of 6/10. The pain is the same all the time. The symptoms are aggravated by bending, position, standing and twisting. Stiffness is present all day. Associated symptoms include leg pain, numbness, paresthesias, pelvic pain, tingling and weakness. Pertinent negatives include no abdominal pain, bladder incontinence, bowel incontinence, chest pain, dysuria, fever, paresis, perianal numbness or weight loss. Risk factors include obesity.     Low back pain.  About 1 month.  Intermittent.  Getting little bit worse.  Bilateral low back but more on the right side than the left.  There is some radiation of symptoms into the right hip with some tingling and a discomfort feeling.  He feels like the right hip is sometimes weak.  However there is no weakness in lower extremities.  No change in urination.  No saddle anesthesia.  No hematuria.  No abdominal pain.  There is been no injury.  No repetitive motion.  He is just started exercising again.  He has been otherwise sedentary.  The back pain started before the exercise.  He did not injure himself at work.  He has had some pain in the distant past like this but not in some time.  The symptoms are mild to moderate but problematic for him.  They do not bother him at nighttime.  They seem to be not related any particular movement.  Just comes on fast at times.        The following portions of the patient's history were reviewed and updated as  "appropriate: allergies, current medications, past family history, past medical history, past social history, past surgical history and problem list.          Review of Systems   Constitutional: Negative for fever and weight loss.   Cardiovascular: Negative for chest pain.   Gastrointestinal: Negative for abdominal pain and bowel incontinence.   Genitourinary: Positive for pelvic pain. Negative for bladder incontinence and dysuria.   Musculoskeletal: Positive for back pain.   Neurological: Positive for tingling, weakness, numbness and paresthesias.       Objective   Blood pressure 128/86, pulse 80, temperature 97.5 °F (36.4 °C), temperature source Temporal, height 182.9 cm (72.01\"), weight 132 kg (292 lb), SpO2 97 %.  Physical Exam  Constitutional:       Appearance: He is obese. He is not ill-appearing.   HENT:      Head: Atraumatic.   Cardiovascular:      Rate and Rhythm: Normal rate and regular rhythm.   Pulmonary:      Effort: Pulmonary effort is normal.   Musculoskeletal:         General: Tenderness present.      Comments: Full range of motion of the lumbar spine.  There is pain to palpation at the right sacroiliac joint.  No sciatic notch tenderness.  No right hip bursitis tenderness.  Negative straight leg lift.  Strength is 5 out of 5 all major muscle groups in lower extremities.  DTRs are +2/4 bilaterally at the patellar reflexes, 1/4 bilaterally at the Achilles reflexes.  Symmetric.  Gait is nonantalgic.         Assessment/Plan   Vinnie was seen today for back pain.    Diagnoses and all orders for this visit:    Acute bilateral low back pain without sciatica      Acute low back pain.  No definite sciatica.  More referred pain.  No red flags at this time.  I am recommending Aleve 2 tablets twice a day for the next week and then after that as needed.  If not 100% better in 1 month patient understands let us know with then consider MRI and/or further evaluation.  If not improving the next couple weeks and also " consider physical therapy.  Otherwise continue exercising at the gym.  Also core exercises.  I will see him back otherwise as scheduled.         Answers for HPI/ROS submitted by the patient on 9/13/2020   Back pain  What is the primary reason for your visit?: Back Pain

## 2020-12-07 ENCOUNTER — TELEMEDICINE (OUTPATIENT)
Dept: FAMILY MEDICINE CLINIC | Facility: TELEHEALTH | Age: 50
End: 2020-12-07

## 2020-12-07 DIAGNOSIS — J06.9 UPPER RESPIRATORY TRACT INFECTION, UNSPECIFIED TYPE: Primary | ICD-10-CM

## 2020-12-07 PROCEDURE — 99212 OFFICE O/P EST SF 10 MIN: CPT | Performed by: NURSE PRACTITIONER

## 2020-12-07 NOTE — PROGRESS NOTES
CHIEF COMPLAINT  Chief Complaint   Patient presents with   • URI         HPI  Vinnie Ventura is a 50 y.o. male  presents with complaint of 3 day history of body aches, mild congestion, sore throat, low-grade fever, mild cough began on Saturday with fever of 100.2.  Sunday, still had symptoms but no fever.  Today, he states still having congestion and sore throat, but denies cough, fever, shortness of breath, loss of taste/smell.  He works at 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    Review of Systems   Constitutional: Positive for activity change and fatigue. Negative for appetite change, chills, diaphoresis and fever.   HENT: Positive for congestion and sore throat. Negative for ear pain, postnasal drip, rhinorrhea, sinus pressure and sinus pain.    Respiratory: Negative for cough, chest tightness, shortness of breath and wheezing.    Neurological: Negative for dizziness and headaches.   All other systems reviewed and are negative.      Past Medical History:   Diagnosis Date   • Encounter for special screening examination for neoplasm of prostate     > 5 yrs ago   • Eye exam normal     > 5 yrs ago   • GERD (gastroesophageal reflux disease)    • SVT (supraventricular tachycardia) (CMS/[facility])        Family History   Problem Relation Age of Onset   • Hypertension Mother    • Heart disease Father    • Diabetes Father    • Hypertension Father    • Heart disease Maternal Grandmother    • Diabetes Maternal Grandmother    • Hypertension Maternal Grandmother    • Heart disease Maternal Grandfather    • Heart disease Paternal Grandmother    • Hypertension Paternal Grandmother    • Heart disease Paternal Grandfather    • Breast cancer Sister        Social History     Socioeconomic History   • Marital status: Single     Spouse name: Not on file   • Number of children: Not on file   • Years of education: Not on file   • Highest education level: Not on file   Tobacco Use   • Smoking status:  Former Smoker     Packs/day: 1.50     Years: 13.00     Pack years: 19.50     Quit date: 2011     Years since quittin.6   • Smokeless tobacco: Never Used   Substance and Sexual Activity   • Alcohol use: No   • Drug use: No         There were no vitals taken for this visit.    PHYSICAL EXAM  Physical Exam   Constitutional: He is oriented to person, place, and time. He appears well-developed and well-nourished.   HENT:   Head: Normocephalic and atraumatic.   Pulmonary/Chest: Effort normal.  No respiratory distress.  Neurological: He is alert and oriented to person, place, and time.       Results for orders placed or performed in visit on 20   COVID-19,BIOTAP, NP/OP SWAB IN TRANSPORT MEDIA OR SALINE 24-36 HR TAT - Swab, Nasopharynx    Specimen: Nasopharynx; Swab   Result Value Ref Range    Reference Lab Report      COVID19 Not Detected Not Detected - Ref. Range       Diagnoses and all orders for this visit:    1. Upper respiratory tract infection, unspecified type (Primary)  -     COVID-19,[facility] ROUTINE, NP/OP SWAB IN TRANSPORT MEDIA OR ESWAB 72 HR TAT - Swab, Nasopharynx; Future  --rule out COVID-19 infection  --quarantine x 10 days until symptoms improve or resolve and fever-free without the use of fever-reducing medication  --if COVID-19 test comes back negative, may return to work prior to 10 days  --advised to notify Employee Health       FOLLOW-UP  As discussed during visit with PCP/[facility] if no improvement or Urgent Care/Emergency Department if worsening of symptoms    Patient verbalizes understanding of medication dosage, comfort measures, instructions for treatment and follow-up.    CHRISTINA Barfield  2020  10:12 EST    This visit was performed via Telehealth.  This patient has been instructed to follow-up with their primary care provider if their symptoms worsen or the treatment provided does not resolve their illness.

## 2020-12-07 NOTE — PATIENT INSTRUCTIONS
Upper Respiratory Infection, Adult  An upper respiratory infection (URI) is a common viral infection of the nose, throat, and upper air passages that lead to the lungs. The most common type of URI is the common cold. URIs usually get better on their own, without medical treatment.  What are the causes?  A URI is caused by a virus. You may catch a virus by:  · Breathing in droplets from an infected person's cough or sneeze.  · Touching something that has been exposed to the virus (contaminated) and then touching your mouth, nose, or eyes.  What increases the risk?  You are more likely to get a URI if:  · You are very young or very old.  · It is li or winter.  · You have close contact with others, such as at a , school, or health care facility.  · You smoke.  · You have long-term (chronic) heart or lung disease.  · You have a weakened disease-fighting (immune) system.  · You have nasal allergies or asthma.  · You are experiencing a lot of stress.  · You work in an area that has poor air circulation.  · You have poor nutrition.  What are the signs or symptoms?  A URI usually involves some of the following symptoms:  · Runny or stuffy (congested) nose.  · Sneezing.  · Cough.  · Sore throat.  · Headache.  · Fatigue.  · Fever.  · Loss of appetite.  · Pain in your forehead, behind your eyes, and over your cheekbones (sinus pain).  · Muscle aches.  · Redness or irritation of the eyes.  · Pressure in the ears or face.  How is this diagnosed?  This condition may be diagnosed based on your medical history and symptoms, and a physical exam. Your health care provider may use a cotton swab to take a mucus sample from your nose (nasal swab). This sample can be tested to determine what virus is causing the illness.  How is this treated?  URIs usually get better on their own within 7-10 days. You can take steps at home to relieve your symptoms. Medicines cannot cure URIs, but your health care provider may recommend  certain medicines to help relieve symptoms, such as:  · Over-the-counter cold medicines.  · Cough suppressants. Coughing is a type of defense against infection that helps to clear the respiratory system, so take these medicines only as recommended by your health care provider.  · Fever-reducing medicines.  Follow these instructions at home:  Activity  · Rest as needed.  · If you have a fever, stay home from work or school until your fever is gone or until your health care provider says you are no longer contagious. Your health care provider may have you wear a face mask to prevent your infection from spreading.  Relieving symptoms  · Gargle with a salt-water mixture 3-4 times a day or as needed. To make a salt-water mixture, completely dissolve ½-1 tsp of salt in 1 cup of warm water.  · Use a cool-mist humidifier to add moisture to the air. This can help you breathe more easily.  Eating and drinking    · Drink enough fluid to keep your urine pale yellow.  · Eat soups and other clear broths.  General instructions    · Take over-the-counter and prescription medicines only as told by your health care provider. These include cold medicines, fever reducers, and cough suppressants.  · Do not use any products that contain nicotine or tobacco, such as cigarettes and e-cigarettes. If you need help quitting, ask your health care provider.  · Stay away from secondhand smoke.  · Stay up to date on all immunizations, including the yearly (annual) flu vaccine.  · Keep all follow-up visits as told by your health care provider. This is important.  How to prevent the spread of infection to others    · URIs can be passed from person to person (are contagious). To prevent the infection from spreading:  ? Wash your hands often with soap and water. If soap and water are not available, use hand .  ? Avoid touching your mouth, face, eyes, or nose.  ? Cough or sneeze into a tissue or your sleeve or elbow instead of into your hand  or into the air.  Contact a health care provider if:  · You are getting worse instead of better.  · You have a fever or chills.  · Your mucus is brown or red.  · You have yellow or brown discharge coming from your nose.  · You have pain in your face, especially when you bend forward.  · You have swollen neck glands.  · You have pain while swallowing.  · You have white areas in the back of your throat.  Get help right away if:  · You have shortness of breath that gets worse.  · You have severe or persistent:  ? Headache.  ? Ear pain.  ? Sinus pain.  ? Chest pain.  · You have chronic lung disease along with any of the following:  ? Wheezing.  ? Prolonged cough.  ? Coughing up blood.  ? A change in your usual mucus.  · You have a stiff neck.  · You have changes in your:  ? Vision.  ? Hearing.  ? Thinking.  ? Mood.  Summary  · An upper respiratory infection (URI) is a common infection of the nose, throat, and upper air passages that lead to the lungs.  · A URI is caused by a virus.  · URIs usually get better on their own within 7-10 days.  · Medicines cannot cure URIs, but your health care provider may recommend certain medicines to help relieve symptoms.  This information is not intended to replace advice given to you by your health care provider. Make sure you discuss any questions you have with your health care provider.  Document Revised: 12/26/2019 Document Reviewed: 08/03/2018  [employer] Patient Education © 2020 [employer]    COVID-19  COVID-19 is a respiratory infection that is caused by a virus called severe acute respiratory syndrome coronavirus 2 (SARS-CoV-2). The disease is also known as coronavirus disease or novel coronavirus. In some people, the virus may not cause any symptoms. In others, it may cause a serious infection. The infection can get worse quickly and can lead to complications, such as:  · Pneumonia, or infection of the lungs.  · Acute respiratory distress syndrome or ARDS. This is a condition  in which fluid build-up in the lungs prevents the lungs from filling with air and passing oxygen into the blood.  · Acute respiratory failure. This is a condition in which there is not enough oxygen passing from the lungs to the body or when carbon dioxide is not passing from the lungs out of the body.  · Sepsis or septic shock. This is a serious bodily reaction to an infection.  · Blood clotting problems.  · Secondary infections due to bacteria or fungus.  · Organ failure. This is when your body's organs stop working.  The virus that causes COVID-19 is contagious. This means that it can spread from person to person through droplets from coughs and sneezes (respiratory secretions).  What are the causes?  This illness is caused by a virus. You may catch the virus by:  · Breathing in droplets from an infected person. Droplets can be spread by a person breathing, speaking, singing, coughing, or sneezing.  · Touching something, like a table or a doorknob, that was exposed to the virus (contaminated) and then touching your mouth, nose, or eyes.  What increases the risk?  Risk for infection  You are more likely to be infected with this virus if you:  · Are within 6 feet (2 meters) of a person with COVID-19.  · Provide care for or live with a person who is infected with COVID-19.  · Spend time in crowded indoor spaces or live in shared housing.  Risk for serious illness  You are more likely to become seriously ill from the virus if you:  · Are 50 years of age or older. The higher your age, the more you are at risk for serious illness.  · Live in a nursing home or long-term care facility.  · Have cancer.  · Have a long-term (chronic) disease such as:  ? Chronic lung disease, including chronic obstructive pulmonary disease or asthma.  ? A long-term disease that lowers your body's ability to fight infection (immunocompromised).  ? Heart disease, including heart failure, a condition in which the arteries that lead to the heart  "become narrow or blocked (coronary artery disease), a disease which makes the heart muscle thick, weak, or stiff (cardiomyopathy).  ? Diabetes.  ? Chronic kidney disease.  ? Sickle cell disease, a condition in which red blood cells have an abnormal \"sickle\" shape.  ? Liver disease.  · Are obese.  What are the signs or symptoms?  Symptoms of this condition can range from mild to severe. Symptoms may appear any time from 2 to 14 days after being exposed to the virus. They include:  · A fever or chills.  · A cough.  · Difficulty breathing.  · Headaches, body aches, or muscle aches.  · Runny or stuffy (congested) nose.  · A sore throat.  · New loss of taste or smell.  Some people may also have stomach problems, such as nausea, vomiting, or diarrhea.  Other people may not have any symptoms of COVID-19.  How is this diagnosed?  This condition may be diagnosed based on:  · Your signs and symptoms, especially if:  ? You live in an area with a COVID-19 outbreak.  ? You recently traveled to or from an area where the virus is common.  ? You provide care for or live with a person who was diagnosed with COVID-19.  ? You were exposed to a person who was diagnosed with COVID-19.  · A physical exam.  · Lab tests, which may include:  ? Taking a sample of fluid from the back of your nose and throat (nasopharyngeal fluid), your nose, or your throat using a swab.  ? A sample of mucus from your lungs (sputum).  ? Blood tests.  · Imaging tests, which may include, X-rays, CT scan, or ultrasound.  How is this treated?  At present, there is no medicine to treat COVID-19. Medicines that treat other diseases are being used on a trial basis to see if they are effective against COVID-19.  Your health care provider will talk with you about ways to treat your symptoms. For most people, the infection is mild and can be managed at home with rest, fluids, and over-the-counter medicines.  Treatment for a serious infection usually takes places in a " hospital intensive care unit (ICU). It may include one or more of the following treatments. These treatments are given until your symptoms improve.  · Receiving fluids and medicines through an IV.  · Supplemental oxygen. Extra oxygen is given through a tube in the nose, a face mask, or a donato.  · Positioning you to lie on your stomach (prone position). This makes it easier for oxygen to get into the lungs.  · Continuous positive airway pressure (CPAP) or bi-level positive airway pressure (BPAP) machine. This treatment uses mild air pressure to keep the airways open. A tube that is connected to a motor delivers oxygen to the body.  · Ventilator. This treatment moves air into and out of the lungs by using a tube that is placed in your windpipe.  · Tracheostomy. This is a procedure to create a hole in the neck so that a breathing tube can be inserted.  · Extracorporeal membrane oxygenation (ECMO). This procedure gives the lungs a chance to recover by taking over the functions of the heart and lungs. It supplies oxygen to the body and removes carbon dioxide.  Follow these instructions at home:  Lifestyle  · If you are sick, stay home except to get medical care. Your health care provider will tell you how long to stay home. Call your health care provider before you go for medical care.  · Rest at home as told by your health care provider.  · Do not use any products that contain nicotine or tobacco, such as cigarettes, e-cigarettes, and chewing tobacco. If you need help quitting, ask your health care provider.  · Return to your normal activities as told by your health care provider. Ask your health care provider what activities are safe for you.  General instructions  · Take over-the-counter and prescription medicines only as told by your health care provider.  · Drink enough fluid to keep your urine pale yellow.  · Keep all follow-up visits as told by your health care provider. This is important.  How is this  prevented?    There is no vaccine to help prevent COVID-19 infection. However, there are steps you can take to protect yourself and others from this virus.  To protect yourself:   · Do not travel to areas where COVID-19 is a risk. The areas where COVID-19 is reported change often. To identify high-risk areas and travel restrictions, check the [facility] travel website: wwwnc.cdc.gov/travel/notices  · If you live in, or must travel to, an area where COVID-19 is a risk, take precautions to avoid infection.  ? Stay away from people who are sick.  ? Wash your hands often with soap and water for 20 seconds. If soap and water are not available, use an alcohol-based hand .  ? Avoid touching your mouth, face, eyes, or nose.  ? Avoid going out in public, follow guidance from your state and local health authorities.  ? If you must go out in public, wear a cloth face covering or face mask. Make sure your mask covers your nose and mouth.  ? Avoid crowded indoor spaces. Stay at least 6 feet (2 meters) away from others.  ? Disinfect objects and surfaces that are frequently touched every day. This may include:  § Counters and tables.  § Doorknobs and light switches.  § Sinks and faucets.  § Electronics, such as phones, remote controls, keyboards, computers, and tablets.  To protect others:  If you have symptoms of COVID-19, take steps to prevent the virus from spreading to others.  · If you think you have a COVID-19 infection, contact your health care provider right away. Tell your health care team that you think you may have a COVID-19 infection.  · Stay home. Leave your house only to seek medical care. Do not use public transport.  · Do not travel while you are sick.  · Wash your hands often with soap and water for 20 seconds. If soap and water are not available, use alcohol-based hand .  · Stay away from other members of your household. Let healthy household members care for children and pets, if possible. If you  have to care for children or pets, wash your hands often and wear a mask. If possible, stay in your own room, separate from others. Use a different bathroom.  · Make sure that all people in your household wash their hands well and often.  · Cough or sneeze into a tissue or your sleeve or elbow. Do not cough or sneeze into your hand or into the air.  · Wear a cloth face covering or face mask. Make sure your mask covers your nose and mouth.  Where to find more information  · Centers for Disease Control and Prevention: www.cdc.gov/coronavirus/2019-ncov/index.html  · World Health Organization: www.who.int/health-topics/coronavirus  Contact a health care provider if:  · You live in or have traveled to an area where COVID-19 is a risk and you have symptoms of the infection.  · You have had contact with someone who has COVID-19 and you have symptoms of the infection.  Get help right away if:  · You have trouble breathing.  · You have pain or pressure in your chest.  · You have confusion.  · You have bluish lips and fingernails.  · You have difficulty waking from sleep.  · You have symptoms that get worse.  These symptoms may represent a serious problem that is an emergency. Do not wait to see if the symptoms will go away. Get medical help right away. Call your local emergency services (911 in the [place] Do not drive yourself to the hospital. Let the emergency medical personnel know if you think you have COVID-19.  Summary  · COVID-19 is a respiratory infection that is caused by a virus. It is also known as coronavirus disease or novel coronavirus. It can cause serious infections, such as pneumonia, acute respiratory distress syndrome, acute respiratory failure, or sepsis.  · The virus that causes COVID-19 is contagious. This means that it can spread from person to person through droplets from breathing, speaking, singing, coughing, or sneezing.  · You are more likely to develop a serious illness if you are 50 years of age  or older, have a weak immune system, live in a nursing home, or have chronic disease.  · There is no medicine to treat COVID-19. Your health care provider will talk with you about ways to treat your symptoms.  · Take steps to protect yourself and others from infection. Wash your hands often and disinfect objects and surfaces that are frequently touched every day. Stay away from people who are sick and wear a mask if you are sick.  This information is not intended to replace advice given to you by your health care provider. Make sure you discuss any questions you have with your health care provider.  Document Revised: 10/16/2020 Document Reviewed: 01/23/2020  [employer] Patient Education © 2020 [employer]  How to Quarantine at Home  Information for Patients and Families    These instructions are for people with confirmed or suspected COVID-19 who do not need to be hospitalized and those with confirmed COVID-19 who were hospitalized and discharged to care for themselves at home.    If you were tested through the Health Department  The Health Department will monitor your wellbeing.  If it is determined that you do not need to be hospitalized and can be isolated at home, you will be monitored by staff from your local or state health department.     If you were tested through a Commercial [facility]  You will need to monitor yourself and report changes in your symptoms to your doctor.  See the section below called Monitor Your Symptoms.    Follow these steps until a healthcare provider or local or state health department says you can return to your normal activities.    Stay home except to get medical care  • Restrict activities outside your home, except for getting medical care.   • Do not go to work, school, or public areas.   • Avoid using public transportation, ride-sharing, or taxis.    Separate yourself from other people and animals in your home  People  As much as possible, you should stay in a specific room and away  from other people in your home. Also, you should use a separate bathroom, if available.    Animals  You should restrict contact with pets and other animals while you are sick with COVID-19, just like you would around other people. When possible, have another member of your household care for your animals while you are sick. If you are sick with COVID-19, avoid contact with your pet, including petting, snuggling, being kissed or licked, and sharing food. If you must care for your pet or be around animals while you are sick, wash your hands before and after you interact with pets and wear a facemask. See COVID-19 and Animals for more information.    Call ahead before visiting your doctor  If you have a medical appointment, call the healthcare provider and tell them that you have or may have COVID-19. This information will help the healthcare provider’s office take steps to keep other people from getting infected or exposed.    Wear a facemask  You should wear a facemask when you are around other people (e.g., sharing a room or vehicle) or pets and before you enter a healthcare provider’s office.     If you are not able to wear a facemask (for example, because it causes trouble breathing), then people who live with you should not stay in the same room with you, or they should wear a facemask if they enter your room.    Cover your coughs and sneezes  • Cover your mouth and nose with a tissue when you cough or sneeze.   • Throw used tissues in a lined trash can.   • Immediately wash your hands with soap and water for at least 20 seconds or, if soap and water are not available, clean your hands with an alcohol-based hand  that contains at least 60% alcohol.    Clean your hands often  • Wash your hands often with soap and water for at least 20 seconds, especially after blowing your nose, coughing, or sneezing; going to the bathroom; and before eating or preparing food.     • If soap and water are not readily  available, use an alcohol-based hand  with at least 60% alcohol, covering all surfaces of your hands and rubbing them together until they feel dry.    • Soap and water are the best option if hands are visibly dirty. Avoid touching your eyes, nose, and mouth with unwashed hands.    Avoid sharing personal household items  • You should not share dishes, drinking glasses, cups, eating utensils, towels, or bedding with other people or pets in your home.   • After using these items, they should be washed thoroughly with soap and water.    Clean all “high-touch” surfaces everyday  • High touch surfaces include counters, tabletops, doorknobs, bathroom fixtures, toilets, phones, keyboards, tablets, and bedside tables.   • Also, clean any surfaces that may have blood, stool, or body fluids on them.   • Use a household cleaning spray or wipe, according to the label instructions. Labels contain instructions for safe and effective use of the cleaning product, including precautions you should take when applying the product, such as wearing gloves and making sure you have good ventilation during use of the product.    Monitor your symptoms  • Seek prompt medical attention if your illness is worsening (e.g., difficulty breathing).   • Before seeking care, call your healthcare provider and tell them that you have, or are being evaluated for, COVID-19.   • Put on a facemask before you enter the facility.     • These steps will help the healthcare provider’s office to keep other people in the office or waiting room from getting infected or exposed.   • Persons who are placed under active monitoring or facilitated self-monitoring should follow instructions provided by their local health department or occupational health professionals, as appropriate.  • If you have a medical emergency and need to call 911, notify the dispatch personnel that you have, or are being evaluated for COVID-19. If possible, put on a facemask before  emergency medical services arrive.    Discontinuing home isolation  Patients with confirmed COVID-19 should remain under home isolation precautions until the risk of secondary transmission to others is thought to be low. The decision to discontinue home isolation precautions should be made on a case-by-case basis, in consultation with healthcare providers and state and local health departments.    The below content are for household members, intimate partners, and caregivers of a patient with symptomatic laboratory-confirmed COVID-19 or a patient under investigation:    Household members, intimate partners, and caregivers may have close contact with a person with symptomatic, laboratory-confirmed COVID-19 or a person under investigation.     Close contacts should monitor their health; they should call their healthcare provider right away if they develop symptoms suggestive of COVID-19 (e.g., fever, cough, shortness of breath)     Close contacts should also follow these recommendations:  • Make sure that you understand and can help the patient follow their healthcare provider’s instructions for medication(s) and care. You should help the patient with basic needs in the home and provide support for getting groceries, prescriptions, and other personal needs.  • Monitor the patient’s symptoms. If the patient is getting sicker, call his or her healthcare provider and tell them that the patient has laboratory-confirmed COVID-19. This will help the healthcare provider’s office take steps to keep other people in the office or waiting room from getting infected. Ask the healthcare provider to call the local or state health department for additional guidance. If the patient has a medical emergency and you need to call 911, notify the dispatch personnel that the patient has, or is being evaluated for COVID-19.  • Household members should stay in another room or be  from the patient as much as possible. Household  members should use a separate bedroom and bathroom, if available.  • Prohibit visitors who do not have an essential need to be in the home.  • Household members should care for any pets in the home. Do not handle pets or other animals while sick.  For more information, see COVID-19 and Animals.  • Make sure that shared spaces in the home have good air flow, such as by an air conditioner or an opened window, weather permitting.  • Perform hand hygiene frequently. Wash your hands often with soap and water for at least 20 seconds or use an alcohol-based hand  that contains 60 to 95% alcohol, covering all surfaces of your hands and rubbing them together until they feel dry. Soap and water should be used preferentially if hands are visibly dirty.  • Avoid touching your eyes, nose, and mouth with unwashed hands.  • The patient should wear a facemask when you are around other people. If the patient is not able to wear a facemask (for example, because it causes trouble breathing), you, as the caregiver, should wear a mask when you are in the same room as the patient.  • Wear a disposable facemask and gloves when you touch or have contact with the patient’s blood, stool, or body fluids, such as saliva, sputum, nasal mucus, vomit, or urine.   o Throw out disposable facemasks and gloves after using them. Do not reuse.  o When removing personal protective equipment, first remove and dispose of gloves. Then, immediately clean your hands with soap and water or alcohol-based hand . Next, remove and dispose of facemask, and immediately clean your hands again with soap and water or alcohol-based hand .  • Avoid sharing household items with the patient. You should not share dishes, drinking glasses, cups, eating utensils, towels, bedding, or other items. After the patient uses these items, you should wash them thoroughly (see below “Wash laundry thoroughly”).  • Clean all “high-touch” surfaces, such as  counters, tabletops, doorknobs, bathroom fixtures, toilets, phones, keyboards, tablets, and bedside tables, every day. Also, clean any surfaces that may have blood, stool, or body fluids on them.   o Use a household cleaning spray or wipe, according to the label instructions. Labels contain instructions for safe and effective use of the cleaning product including precautions you should take when applying the product, such as wearing gloves and making sure you have good ventilation during use of the product.  • Wash laundry thoroughly.   o Immediately remove and wash clothes or bedding that have blood, stool, or body fluids on them.  o Wear disposable gloves while handling soiled items and keep soiled items away from your body. Clean your hands (with soap and water or an alcohol-based hand ) immediately after removing your gloves.  o Read and follow directions on labels of laundry or clothing items and detergent. In general, using a normal laundry detergent according to washing machine instructions and dry thoroughly using the warmest temperatures recommended on the clothing label.  • Place all used disposable gloves, facemasks, and other contaminated items in a lined container before disposing of them with other household waste. Clean your hands (with soap and water or an alcohol-based hand ) immediately after handling these items. Soap and water should be used preferentially if hands are visibly dirty.  • Discuss any additional questions with your state or local health department or healthcare provider.    Adapted from information provided by the Centers for Disease Control and Prevention.  For more information, visit https://www.cdc.gov/coronavirus/2019-ncov/hcp/guidance-prevent-spread.html

## 2020-12-09 ENCOUNTER — PATIENT MESSAGE (OUTPATIENT)
Dept: INTERNAL MEDICINE | Facility: CLINIC | Age: 50
End: 2020-12-09

## 2021-01-11 RX ORDER — LISINOPRIL 10 MG/1
10 TABLET ORAL DAILY
Qty: 90 TABLET | Refills: 0 | Status: SHIPPED | OUTPATIENT
Start: 2021-01-11 | End: 2021-01-27

## 2021-01-16 ENCOUNTER — IMMUNIZATION (OUTPATIENT)
Dept: VACCINE CLINIC | Facility: HOSPITAL | Age: 51
End: 2021-01-16

## 2021-01-16 PROCEDURE — 0001A: CPT | Performed by: INTERNAL MEDICINE

## 2021-01-16 PROCEDURE — 91300 HC SARSCOV02 VAC 30MCG/0.3ML IM: CPT | Performed by: INTERNAL MEDICINE

## 2021-01-18 RX ORDER — OMEPRAZOLE 20 MG/1
20 CAPSULE, DELAYED RELEASE ORAL DAILY
Qty: 90 CAPSULE | Refills: 1 | Status: SHIPPED | OUTPATIENT
Start: 2021-01-18 | End: 2021-01-25 | Stop reason: SDUPTHER

## 2021-01-25 RX ORDER — OMEPRAZOLE 20 MG/1
20 CAPSULE, DELAYED RELEASE ORAL DAILY
Qty: 90 CAPSULE | Refills: 1 | Status: SHIPPED | OUTPATIENT
Start: 2021-01-25 | End: 2021-01-27

## 2021-01-27 RX ORDER — OMEPRAZOLE 20 MG/1
20 CAPSULE, DELAYED RELEASE ORAL DAILY
Qty: 90 CAPSULE | Refills: 1 | Status: SHIPPED | OUTPATIENT
Start: 2021-01-27 | End: 2021-09-05 | Stop reason: SDUPTHER

## 2021-01-27 RX ORDER — LISINOPRIL 10 MG/1
10 TABLET ORAL DAILY
Qty: 90 TABLET | Refills: 1 | Status: SHIPPED | OUTPATIENT
Start: 2021-01-27 | End: 2021-10-18 | Stop reason: SDUPTHER

## 2021-02-07 ENCOUNTER — IMMUNIZATION (OUTPATIENT)
Dept: VACCINE CLINIC | Facility: HOSPITAL | Age: 51
End: 2021-02-07

## 2021-02-07 PROCEDURE — 91300 HC SARSCOV02 VAC 30MCG/0.3ML IM: CPT | Performed by: INTERNAL MEDICINE

## 2021-02-07 PROCEDURE — 0002A: CPT | Performed by: INTERNAL MEDICINE

## 2021-05-27 ENCOUNTER — OFFICE VISIT (OUTPATIENT)
Dept: FAMILY MEDICINE CLINIC | Facility: CLINIC | Age: 51
End: 2021-05-27

## 2021-05-27 VITALS
DIASTOLIC BLOOD PRESSURE: 97 MMHG | HEART RATE: 101 BPM | TEMPERATURE: 97.5 F | BODY MASS INDEX: 39.91 KG/M2 | HEIGHT: 72 IN | WEIGHT: 294.7 LBS | OXYGEN SATURATION: 94 % | SYSTOLIC BLOOD PRESSURE: 146 MMHG

## 2021-05-27 DIAGNOSIS — M54.50 ACUTE RIGHT-SIDED LOW BACK PAIN WITHOUT SCIATICA: Primary | ICD-10-CM

## 2021-05-27 PROCEDURE — 99213 OFFICE O/P EST LOW 20 MIN: CPT | Performed by: FAMILY MEDICINE

## 2021-05-27 RX ORDER — CYCLOBENZAPRINE HCL 10 MG
10 TABLET ORAL 3 TIMES DAILY PRN
Qty: 21 TABLET | Refills: 0 | Status: SHIPPED | OUTPATIENT
Start: 2021-05-27

## 2021-05-27 RX ORDER — NAPROXEN 500 MG/1
500 TABLET ORAL 2 TIMES DAILY WITH MEALS
Qty: 30 TABLET | Refills: 0 | Status: SHIPPED | OUTPATIENT
Start: 2021-05-27 | End: 2021-10-22 | Stop reason: HOSPADM

## 2021-05-27 NOTE — PATIENT INSTRUCTIONS
Your low back pain should get better with time.  Hopefully soon.  I recommend naproxen twice a day as needed for back pain.  Flexeril 10 mg up to 3 times a day as needed for back spasm.  This will also make you slightly sleepy.  Be careful driving.  I recommend keeping moving as best you can.  I did give you a note to return to work in 3 days.  I also recommend physical therapy if not improving by next week.  Please give them a call today and try to get an appointment.  If you have further questions or concerns please let us know.  If your symptoms persist greater than 6 weeks want to consider an MRI.  With very severe symptoms seek medical attention sooner.

## 2021-05-27 NOTE — PROGRESS NOTES
"Answers for HPI/ROS submitted by the patient on 5/27/2021  What is the primary reason for your visit?: Back Pain  Chronicity: new  Onset: in the past 7 days  Frequency: constantly  Progression since onset: gradually worsening  Pain location: lumbar spine  Pain quality: aching, cramping, stabbing    Chief Complaint  Back Pain (x 5/25/21- pt states difficult to walk/sit- lower right side )    Subjective          Vinnie Ventura presents to [facility]  History of Present Illness    3 days low back pain.  Right lower back.  He was in [place] on a roller coaster late last week.  Felt something pop in his back but had no pain.  Then he developed some pain over the weekend.  Then he was lifting a lot of heavy objects around his house including a very heavy letter.  Doing a lot of mulching and yard work.  And then 3 days ago developed severe right lower back pain with spasm at times no radiation to the legs.  May be some odd sensation in the legs but no pain or numbness or weakness.  No change in urination.  No fever.  No hematuria.  His previous back pain from last year had resolved shortly thereafter.  He did not injure himself at work.    Objective   Vital Signs:   /97   Pulse 101   Temp 97.5 °F (36.4 °C) (Temporal)   Ht 182.9 cm (72.01\")   Wt 134 kg (294 lb 11.2 oz)   SpO2 94%   BMI 39.96 kg/m²     Physical Exam  Constitutional:       Appearance: Normal appearance.   Cardiovascular:      Rate and Rhythm: Normal rate.      Pulses: Normal pulses.   Musculoskeletal:      Comments: Lumbar spine with limited range of motion.  He has some pain to palpation and some spasm of the right paralumbar muscles.  There is no midline pain to palpation.  Negative straight leg lift.  Strength is 5 out of 5 in lower extremities.  And his gait is moderately antalgic.  No rash.   Neurological:      Mental Status: He is alert.        Result Review :                 Assessment and Plan    Diagnoses " and all orders for this visit:    1. Acute right-sided low back pain without sciatica (Primary)  -     Ambulatory Referral to Physical Therapy    Other orders  -     naproxen (Naprosyn) 500 MG tablet; Take 1 tablet by mouth 2 (Two) Times a Day With Meals.  Dispense: 30 tablet; Refill: 0  -     cyclobenzaprine (FLEXERIL) 10 MG tablet; Take 1 tablet by mouth 3 (Three) Times a Day As Needed for Muscle Spasms.  Dispense: 21 tablet; Refill: 0      Musculoskeletal acute low back pain.  I recommend naproxen twice a day cyclobenzaprine up to 3 times a day keep active physical therapy and if not improving in 2 to 3 weeks let us know if still bothering in 6 weeks may need an MRI.  With severe symptoms seek medical instructions or attention.  See patient discharge instructions for more information.      Follow Up   No follow-ups on file.  Patient was given instructions and counseling regarding his condition or for health maintenance advice. Please see specific information pulled into the AVS if appropriate.

## 2021-09-05 RX ORDER — OMEPRAZOLE 20 MG/1
20 CAPSULE, DELAYED RELEASE ORAL DAILY
Qty: 90 CAPSULE | Refills: 1 | Status: CANCELLED | OUTPATIENT
Start: 2021-09-05

## 2021-09-07 ENCOUNTER — APPOINTMENT (OUTPATIENT)
Dept: SLEEP MEDICINE | Facility: HOSPITAL | Age: 51
End: 2021-09-07

## 2021-09-07 RX ORDER — OMEPRAZOLE 20 MG/1
20 CAPSULE, DELAYED RELEASE ORAL DAILY
Qty: 90 CAPSULE | Refills: 0 | Status: SHIPPED | OUTPATIENT
Start: 2021-09-07 | End: 2021-11-22 | Stop reason: SDUPTHER

## 2021-09-07 NOTE — TELEPHONE ENCOUNTER
Rx Refill Note  Requested Prescriptions     Pending Prescriptions Disp Refills   • omeprazole (priLOSEC) 20 MG capsule 90 capsule 1     Sig: Take 1 capsule by mouth Daily.      Last office visit with prescribing clinician: 5/27/2021      Next office visit with prescribing clinician: Visit date not found            Xiomara Jackman MA  09/07/21, 07:17 EDT

## 2021-10-06 ENCOUNTER — IMMUNIZATION (OUTPATIENT)
Dept: VACCINE CLINIC | Facility: HOSPITAL | Age: 51
End: 2021-10-06

## 2021-10-06 PROCEDURE — 0004A ADM SARSCOV2 30MCG/0.3ML BOOSTER: CPT | Performed by: INTERNAL MEDICINE

## 2021-10-06 PROCEDURE — 0003A: CPT | Performed by: INTERNAL MEDICINE

## 2021-10-06 PROCEDURE — 91300 HC SARSCOV02 VAC 30MCG/0.3ML IM: CPT | Performed by: INTERNAL MEDICINE

## 2021-10-11 ENCOUNTER — APPOINTMENT (OUTPATIENT)
Dept: GENERAL RADIOLOGY | Facility: HOSPITAL | Age: 51
End: 2021-10-11

## 2021-10-11 PROCEDURE — 73610 X-RAY EXAM OF ANKLE: CPT | Performed by: STUDENT IN AN ORGANIZED HEALTH CARE EDUCATION/TRAINING PROGRAM

## 2021-10-13 ENCOUNTER — OFFICE VISIT (OUTPATIENT)
Dept: ORTHOPEDIC SURGERY | Facility: CLINIC | Age: 51
End: 2021-10-13

## 2021-10-13 VITALS — HEIGHT: 72 IN | TEMPERATURE: 98 F | BODY MASS INDEX: 38.74 KG/M2 | WEIGHT: 286 LBS

## 2021-10-13 DIAGNOSIS — S82.892A CLOSED FRACTURE OF LEFT ANKLE, INITIAL ENCOUNTER: ICD-10-CM

## 2021-10-13 DIAGNOSIS — S82.392A CLOSED FRACTURE OF POSTERIOR MALLEOLUS OF LEFT TIBIA, INITIAL ENCOUNTER: ICD-10-CM

## 2021-10-13 DIAGNOSIS — S82.62XA CLOSED DISPLACED FRACTURE OF LATERAL MALLEOLUS OF LEFT FIBULA, INITIAL ENCOUNTER: Primary | ICD-10-CM

## 2021-10-13 DIAGNOSIS — S93.422A TEAR OF DELTOID LIGAMENT OF LEFT ANKLE, INITIAL ENCOUNTER: ICD-10-CM

## 2021-10-13 PROCEDURE — 99204 OFFICE O/P NEW MOD 45 MIN: CPT | Performed by: ORTHOPAEDIC SURGERY

## 2021-10-13 RX ORDER — HYDROCODONE BITARTRATE AND ACETAMINOPHEN 7.5; 325 MG/1; MG/1
1 TABLET ORAL EVERY 6 HOURS PRN
Qty: 30 TABLET | Refills: 0 | Status: SHIPPED | OUTPATIENT
Start: 2021-10-13 | End: 2021-10-13 | Stop reason: SDUPTHER

## 2021-10-13 RX ORDER — HYDROCODONE BITARTRATE AND ACETAMINOPHEN 7.5; 325 MG/1; MG/1
1 TABLET ORAL EVERY 6 HOURS PRN
Qty: 30 TABLET | Refills: 0 | Status: SHIPPED | OUTPATIENT
Start: 2021-10-13 | End: 2021-10-22 | Stop reason: HOSPADM

## 2021-10-13 RX ORDER — CEFAZOLIN SODIUM IN 0.9 % NACL 3 G/100 ML
3 INTRAVENOUS SOLUTION, PIGGYBACK (ML) INTRAVENOUS ONCE
Status: CANCELLED | OUTPATIENT
Start: 2021-10-22 | End: 2021-10-13

## 2021-10-13 NOTE — PROGRESS NOTES
New Patient Complaint      Patient: Vinnie HUYNH [place]  YOB: 1970 51 y.o. male  Medical Record Number: [id number]    Chief Complaints: I hurt my ankle    History of Present Illness:     Patient injured his left ankle on 10/11/2021 when he was helping a neighbor and fell down a hill.  He was seen at [facility] urgent care and placed into a boot which was ill fitting and subsequently got into a boot that his nephew had.  He reports moderate aching pain in the left ankle medially more so than laterally.  He contacted his primary care provider and got a prescription for Lortab 7.5 p.o. every 6 hours but only for several day supply.    HPI    Allergies: No Known Allergies    Medications:   Current Outpatient Medications on File Prior to Visit   Medication Sig   • calcium-vitamin D (OSCAL-500) 500-200 MG-UNIT per tablet Take 1 tablet by mouth daily.   • HYDROcodone-acetaminophen (NORCO) 7.5-325 MG per tablet Take 1 tablet by mouth Every 6 (Six) Hours As Needed (ankle fracture pain).   • lisinopril (PRINIVIL,ZESTRIL) 10 MG tablet Take 1 tablet by mouth Daily.   • Lycopene 10 MG capsule Take 1 tablet by mouth Daily.   • Misc Natural Products (LUTEIN 20 PO) Take 1 tablet by mouth Daily.   • Multiple Vitamins-Minerals (MULTIVITAMIN WITH MINERALS) tablet Take 1 tablet by mouth daily.   • naproxen (Naprosyn) 500 MG tablet Take 1 tablet by mouth 2 (Two) Times a Day With Meals.   • Omega-3 Fatty Acids (FISH OIL) 1000 MG capsule Take by mouth.   • omeprazole (priLOSEC) 20 MG capsule Take 1 capsule by mouth Daily.   • Saw Palmetto 450 MG capsule Take 1 tablet by mouth Daily.   • SUPER B COMPLEX/C PO Take 1 tablet by mouth Daily.   • cetirizine (zyrTEC) 10 MG tablet Take 10 mg by mouth Daily.   • cyclobenzaprine (FLEXERIL) 10 MG tablet Take 1 tablet by mouth 3 (Three) Times a Day As Needed for Muscle Spasms.     No current facility-administered medications on file prior to visit.       Past Medical History:   Diagnosis  "Date   • CTS (carpal tunnel syndrome) 1996   • Encounter for special screening examination for neoplasm of prostate     > 5 yrs ago   • Eye exam normal     > 5 yrs ago   • GERD (gastroesophageal reflux disease)    • Rotator cuff syndrome 2012   • SVT (supraventricular tachycardia) (HCC)      Past Surgical History:   Procedure Laterality Date   • CARDIAC ABLATION  01/15/2016   • CARPAL TUNNEL RELEASE Bilateral 1994, 2012    [facility]   • CHOLECYSTECTOMY  2013    [facility]   • HAND SURGERY  1996     Social History     Occupational History   • Not on file   Tobacco Use   • Smoking status: Former Smoker     Packs/day: 1.50     Years: 13.00     Pack years: 19.50     Types: Cigarettes     Quit date: 05/2011     Years since quitting: 10.4   • Smokeless tobacco: Never Used   Substance and Sexual Activity   • Alcohol use: No   • Drug use: Never   • Sexual activity: Not Currently     Partners: Male     Birth control/protection: Condom      Social History     Social History Narrative   • Not on file     Family History   Problem Relation Age of Onset   • Hypertension Mother    • Heart disease Father    • Diabetes Father    • Hypertension Father    • Anesthesia problems Father    • Heart disease Maternal Grandmother    • Diabetes Maternal Grandmother    • Hypertension Maternal Grandmother    • Heart disease Maternal Grandfather    • Heart disease Paternal Grandmother    • Hypertension Paternal Grandmother    • Heart disease Paternal Grandfather    • Breast cancer Sister    • Anesthesia problems Sister    • Osteoporosis Sister    • Broken bones Sister    • Broken bones Sister    • Cancer Sister    • Cancer Sister        Review of Systems: 14 point review of systems performed, positive pertinent findings identified in HPI. All remaining systems negative     Review of Systems      Physical Exam:   Vitals:    10/13/21 1145   Temp: 98 °F (36.7 °C)   Weight: 130 kg (286 lb)   Height: 182.9 cm (72\")   PainSc:   6 "     Physical Exam   Constitutional: pleasant, well developed   Eyes: sclera non icteric  Hearing : adequate for exam  Cardiovascular: palpable pulses in left foot, left calf/ thigh NT without sign of DVT  Respiratoy: breathing unlabored   Neurological: grossly sensate to LT throughout left LE  Psychiatric: oriented with normal mood and affect.   Lymphatic: No palpable popliteal lymphadenopathy left LE  Skin: intact throughout left leg/foot  Musculoskeletal: Left ankle shows moderate swelling but no blistering and minimal ecchymosis there is moderate tenderness medially and laterally but none over the dorsum of the midfoot.  Physical Exam  Ortho Exam    Radiology: 3 views of the left ankle reviewed on the [employer] system from 10/11/2021 and no prior x-rays available for comparison there is a mildly displaced fracture of the distal fibula which is oblique and somewhat rotated and associated with a somewhat comminuted fracture of the posterior malleolus.  I do not see any gross widening of the medial clear space.    Assessment/Plan: 1.  Left distal fibula and posterior malleolar fractures with possible deltoid injury.    We discussed treatment options and given the alignment to this I would recommend operative fixation of lateral malleolus and probable syndesmotic stabilization.  There may not be anything to do for the posterior malleolar fracture which is fairly comminuted    Depending on what we see at the time of surgery may require open medial deltoid repair if the clear space remains wide.    He voiced clear understanding the operative procedure with associated risk benefits potential outcomes and complications which can include but not limited to heart attack stroke death pneumonia infection bleeding damage to blood vessels nerves or tendons blood clots pulmonary embolism persistent or worsening pain stiffness malunion nonunion need for subsequent surgery and failure to return to presurgery and precondition levels  of activity.    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    He was fitted with a better fitting boot today with instructions for elevation and nonweightbearing and will continue with use of a scooter.    When to get a CT scan for further evaluation for preoperative planning and tentatively plan on doing this on outpatient basis on 10/22/2021 after line the soft tissues to settle down more.    He was encouraged to call if he has any questions prior to surgery.    Did refill his Lortab 7.5 mg 1 p.o. every 6 hours as needed for pain and had a careful discussion with him today regarding use of narcotics and abuse potential with which he voiced clear understanding.    He will call if he has any questions prior to surgery

## 2021-10-14 ENCOUNTER — APPOINTMENT (OUTPATIENT)
Dept: CT IMAGING | Facility: HOSPITAL | Age: 51
End: 2021-10-14

## 2021-10-14 ENCOUNTER — HOSPITAL ENCOUNTER (OUTPATIENT)
Dept: CT IMAGING | Facility: HOSPITAL | Age: 51
Discharge: HOME | End: 2021-10-14
Admitting: ORTHOPAEDIC SURGERY

## 2021-10-14 DIAGNOSIS — S82.892A CLOSED FRACTURE OF LEFT ANKLE, INITIAL ENCOUNTER: ICD-10-CM

## 2021-10-14 DIAGNOSIS — S93.422A TEAR OF DELTOID LIGAMENT OF LEFT ANKLE, INITIAL ENCOUNTER: ICD-10-CM

## 2021-10-14 DIAGNOSIS — S82.392A CLOSED FRACTURE OF POSTERIOR MALLEOLUS OF LEFT TIBIA, INITIAL ENCOUNTER: ICD-10-CM

## 2021-10-14 DIAGNOSIS — S82.62XA CLOSED DISPLACED FRACTURE OF LATERAL MALLEOLUS OF LEFT FIBULA, INITIAL ENCOUNTER: ICD-10-CM

## 2021-10-14 PROCEDURE — 76377 3D RENDER W/INTRP POSTPROCES: CPT

## 2021-10-14 PROCEDURE — 73700 CT LOWER EXTREMITY W/O DYE: CPT

## 2021-10-19 ENCOUNTER — PRE-ADMISSION TESTING (OUTPATIENT)
Dept: PREADMISSION TESTING | Facility: HOSPITAL | Age: 51
End: 2021-10-19

## 2021-10-19 VITALS
DIASTOLIC BLOOD PRESSURE: 90 MMHG | BODY MASS INDEX: 39.82 KG/M2 | WEIGHT: 294 LBS | SYSTOLIC BLOOD PRESSURE: 150 MMHG | HEART RATE: 84 BPM | HEIGHT: 72 IN | OXYGEN SATURATION: 96 % | TEMPERATURE: 97.1 F | RESPIRATION RATE: 16 BRPM

## 2021-10-19 LAB
ANION GAP SERPL CALCULATED.3IONS-SCNC: 12.6 MMOL/L (ref 5–15)
BUN SERPL-MCNC: 14 MG/DL (ref 6–20)
BUN/CREAT SERPL: 14.7 (ref 7–25)
CALCIUM SPEC-SCNC: 8.9 MG/DL (ref 8.6–10.5)
CHLORIDE SERPL-SCNC: 100 MMOL/L (ref 98–107)
CO2 SERPL-SCNC: 24.4 MMOL/L (ref 22–29)
CREAT SERPL-MCNC: 0.95 MG/DL (ref 0.76–1.27)
DEPRECATED RDW RBC AUTO: 40.1 FL (ref 37–54)
ERYTHROCYTE [DISTWIDTH] IN BLOOD BY AUTOMATED COUNT: 13 % (ref 12.3–15.4)
GFR SERPL CREATININE-BSD FRML MDRD: 84 ML/MIN/1.73
GLUCOSE SERPL-MCNC: 120 MG/DL (ref 65–99)
HCT VFR BLD AUTO: 45.9 % (ref 37.5–51)
HGB BLD-MCNC: 15.7 G/DL (ref 13–17.7)
MCH RBC QN AUTO: 29.3 PG (ref 26.6–33)
MCHC RBC AUTO-ENTMCNC: 34.2 G/DL (ref 31.5–35.7)
MCV RBC AUTO: 85.8 FL (ref 79–97)
PLATELET # BLD AUTO: 249 10*3/MM3 (ref 140–450)
PMV BLD AUTO: 10.8 FL (ref 6–12)
POTASSIUM SERPL-SCNC: 4.6 MMOL/L (ref 3.5–5.2)
QT INTERVAL: 354 MS
RBC # BLD AUTO: 5.35 10*6/MM3 (ref 4.14–5.8)
SARS-COV-2 ORF1AB RESP QL NAA+PROBE: NOT DETECTED
SODIUM SERPL-SCNC: 137 MMOL/L (ref 136–145)
WBC # BLD AUTO: 6.25 10*3/MM3 (ref 3.4–10.8)

## 2021-10-19 PROCEDURE — 85027 COMPLETE CBC AUTOMATED: CPT

## 2021-10-19 PROCEDURE — 93005 ELECTROCARDIOGRAM TRACING: CPT

## 2021-10-19 PROCEDURE — 36415 COLL VENOUS BLD VENIPUNCTURE: CPT

## 2021-10-19 PROCEDURE — 93010 ELECTROCARDIOGRAM REPORT: CPT | Performed by: INTERNAL MEDICINE

## 2021-10-19 PROCEDURE — U0004 COV-19 TEST NON-CDC HGH THRU: HCPCS

## 2021-10-19 PROCEDURE — 80048 BASIC METABOLIC PNL TOTAL CA: CPT

## 2021-10-19 PROCEDURE — C9803 HOPD COVID-19 SPEC COLLECT: HCPCS

## 2021-10-19 RX ORDER — LISINOPRIL 10 MG/1
10 TABLET ORAL DAILY
Qty: 90 TABLET | Refills: 0 | Status: SHIPPED | OUTPATIENT
Start: 2021-10-19 | End: 2022-01-18 | Stop reason: SDUPTHER

## 2021-10-22 ENCOUNTER — HOSPITAL ENCOUNTER (OUTPATIENT)
Facility: HOSPITAL | Age: 51
Setting detail: HOSPITAL OUTPATIENT SURGERY
Discharge: HOME | End: 2021-10-22
Attending: ORTHOPAEDIC SURGERY | Admitting: ORTHOPAEDIC SURGERY

## 2021-10-22 ENCOUNTER — ANESTHESIA EVENT (OUTPATIENT)
Dept: PERIOP | Facility: HOSPITAL | Age: 51
End: 2021-10-22

## 2021-10-22 ENCOUNTER — APPOINTMENT (OUTPATIENT)
Dept: GENERAL RADIOLOGY | Facility: HOSPITAL | Age: 51
End: 2021-10-22

## 2021-10-22 ENCOUNTER — ANESTHESIA (OUTPATIENT)
Dept: PERIOP | Facility: HOSPITAL | Age: 51
End: 2021-10-22

## 2021-10-22 VITALS
BODY MASS INDEX: 39.77 KG/M2 | WEIGHT: 293.21 LBS | RESPIRATION RATE: 16 BRPM | HEART RATE: 90 BPM | TEMPERATURE: 98.1 F | DIASTOLIC BLOOD PRESSURE: 61 MMHG | SYSTOLIC BLOOD PRESSURE: 111 MMHG | OXYGEN SATURATION: 94 %

## 2021-10-22 DIAGNOSIS — S82.892A CLOSED FRACTURE OF LEFT ANKLE, INITIAL ENCOUNTER: ICD-10-CM

## 2021-10-22 DIAGNOSIS — S82.62XA CLOSED DISPLACED FRACTURE OF LATERAL MALLEOLUS OF LEFT FIBULA, INITIAL ENCOUNTER: ICD-10-CM

## 2021-10-22 DIAGNOSIS — S93.422A TEAR OF DELTOID LIGAMENT OF LEFT ANKLE, INITIAL ENCOUNTER: ICD-10-CM

## 2021-10-22 DIAGNOSIS — S82.392A CLOSED FRACTURE OF POSTERIOR MALLEOLUS OF LEFT TIBIA, INITIAL ENCOUNTER: ICD-10-CM

## 2021-10-22 PROCEDURE — C1713 ANCHOR/SCREW BN/BN,TIS/BN: HCPCS | Performed by: ORTHOPAEDIC SURGERY

## 2021-10-22 PROCEDURE — 27829 TREAT LOWER LEG JOINT: CPT | Performed by: ORTHOPAEDIC SURGERY

## 2021-10-22 PROCEDURE — 25010000002 PHENYLEPHRINE 10 MG/ML SOLUTION 5 ML VIAL: Performed by: NURSE ANESTHETIST, CERTIFIED REGISTERED

## 2021-10-22 PROCEDURE — 25010000002 DEXAMETHASONE PER 1 MG: Performed by: ANESTHESIOLOGY

## 2021-10-22 PROCEDURE — 28899 UNLISTED PX FOOT/TOES: CPT | Performed by: ORTHOPAEDIC SURGERY

## 2021-10-22 PROCEDURE — 76942 ECHO GUIDE FOR BIOPSY: CPT | Performed by: ORTHOPAEDIC SURGERY

## 2021-10-22 PROCEDURE — 25010000002 PROPOFOL 10 MG/ML EMULSION: Performed by: NURSE ANESTHETIST, CERTIFIED REGISTERED

## 2021-10-22 PROCEDURE — C1769 GUIDE WIRE: HCPCS | Performed by: ORTHOPAEDIC SURGERY

## 2021-10-22 PROCEDURE — 27695 REPAIR OF ANKLE LIGAMENT: CPT | Performed by: ORTHOPAEDIC SURGERY

## 2021-10-22 PROCEDURE — 25010000002 ROPIVACAINE PER 1 MG: Performed by: ANESTHESIOLOGY

## 2021-10-22 PROCEDURE — 25010000002 SUCCINYLCHOLINE PER 20 MG: Performed by: NURSE ANESTHETIST, CERTIFIED REGISTERED

## 2021-10-22 PROCEDURE — 27792 TREATMENT OF ANKLE FRACTURE: CPT | Performed by: ORTHOPAEDIC SURGERY

## 2021-10-22 PROCEDURE — 73610 X-RAY EXAM OF ANKLE: CPT | Performed by: ORTHOPAEDIC SURGERY

## 2021-10-22 PROCEDURE — 25010000002 DEXAMETHASONE PER 1 MG: Performed by: NURSE ANESTHETIST, CERTIFIED REGISTERED

## 2021-10-22 PROCEDURE — 25010000002 MIDAZOLAM PER 1 MG: Performed by: ANESTHESIOLOGY

## 2021-10-22 PROCEDURE — 25010000002 ONDANSETRON PER 1 MG: Performed by: NURSE ANESTHETIST, CERTIFIED REGISTERED

## 2021-10-22 PROCEDURE — 25010000002 CEFAZOLIN PER 500 MG

## 2021-10-22 PROCEDURE — 25010000002 FENTANYL CITRATE (PF) 50 MCG/ML SOLUTION: Performed by: NURSE ANESTHETIST, CERTIFIED REGISTERED

## 2021-10-22 PROCEDURE — 76000 FLUOROSCOPY <1 HR PHYS/QHP: CPT | Performed by: ORTHOPAEDIC SURGERY

## 2021-10-22 PROCEDURE — 25010000002 ONDANSETRON PER 1 MG: Performed by: ANESTHESIOLOGY

## 2021-10-22 PROCEDURE — 25010000002 FENTANYL CITRATE (PF) 50 MCG/ML SOLUTION: Performed by: ANESTHESIOLOGY

## 2021-10-22 DEVICE — PLT FIB LK DIST SS 5H LT: Type: IMPLANTABLE DEVICE | Site: ANKLE | Status: FUNCTIONAL

## 2021-10-22 DEVICE — SCRW LK LP SS 3.5X14MM: Type: IMPLANTABLE DEVICE | Site: ANKLE | Status: FUNCTIONAL

## 2021-10-22 DEVICE — SCRW LK LP SS 2.7X18MM: Type: IMPLANTABLE DEVICE | Site: ANKLE | Status: FUNCTIONAL

## 2021-10-22 DEVICE — K-LESS T-ROPE W/DRV, SYN REPR, SS
Type: IMPLANTABLE DEVICE | Site: ANKLE | Status: FUNCTIONAL
Brand: ARTHREX®

## 2021-10-22 DEVICE — SCRW LK LP SS 2.7X14MM: Type: IMPLANTABLE DEVICE | Site: ANKLE | Status: FUNCTIONAL

## 2021-10-22 DEVICE — SCRW CORT LP SS 3.5X16MM: Type: IMPLANTABLE DEVICE | Site: ANKLE | Status: FUNCTIONAL

## 2021-10-22 DEVICE — IMPLANTABLE DEVICE: Type: IMPLANTABLE DEVICE | Site: ANKLE | Status: FUNCTIONAL

## 2021-10-22 DEVICE — DX FIBERTAK SUTURE ANCHOR, ST & NDLS
Type: IMPLANTABLE DEVICE | Site: ANKLE | Status: FUNCTIONAL
Brand: ARTHREX®

## 2021-10-22 DEVICE — SCRW LK LP SS 2.7X16MM: Type: IMPLANTABLE DEVICE | Site: ANKLE | Status: FUNCTIONAL

## 2021-10-22 RX ORDER — FENTANYL CITRATE 50 UG/ML
50 INJECTION, SOLUTION INTRAMUSCULAR; INTRAVENOUS ONCE
Status: COMPLETED | OUTPATIENT
Start: 2021-10-22 | End: 2021-10-22

## 2021-10-22 RX ORDER — GLYCOPYRROLATE 0.2 MG/ML
0.2 INJECTION INTRAMUSCULAR; INTRAVENOUS
Status: COMPLETED | OUTPATIENT
Start: 2021-10-22 | End: 2021-10-22

## 2021-10-22 RX ORDER — SUCCINYLCHOLINE CHLORIDE 20 MG/ML
INJECTION INTRAMUSCULAR; INTRAVENOUS AS NEEDED
Status: DISCONTINUED | OUTPATIENT
Start: 2021-10-22 | End: 2021-10-22 | Stop reason: SURG

## 2021-10-22 RX ORDER — FLUMAZENIL 0.1 MG/ML
0.2 INJECTION INTRAVENOUS AS NEEDED
Status: DISCONTINUED | OUTPATIENT
Start: 2021-10-22 | End: 2021-10-22 | Stop reason: HOSPADM

## 2021-10-22 RX ORDER — OXYCODONE HYDROCHLORIDE AND ACETAMINOPHEN 5; 325 MG/1; MG/1
1 TABLET ORAL ONCE AS NEEDED
Status: DISCONTINUED | OUTPATIENT
Start: 2021-10-22 | End: 2021-10-22 | Stop reason: HOSPADM

## 2021-10-22 RX ORDER — CEFAZOLIN SODIUM IN 0.9 % NACL 3 G/100 ML
3 INTRAVENOUS SOLUTION, PIGGYBACK (ML) INTRAVENOUS ONCE
Status: COMPLETED | OUTPATIENT
Start: 2021-10-22 | End: 2021-10-22

## 2021-10-22 RX ORDER — HYDROMORPHONE HYDROCHLORIDE 1 MG/ML
0.5 INJECTION, SOLUTION INTRAMUSCULAR; INTRAVENOUS; SUBCUTANEOUS
Status: DISCONTINUED | OUTPATIENT
Start: 2021-10-22 | End: 2021-10-22 | Stop reason: HOSPADM

## 2021-10-22 RX ORDER — SODIUM CHLORIDE, SODIUM LACTATE, POTASSIUM CHLORIDE, CALCIUM CHLORIDE 600; 310; 30; 20 MG/100ML; MG/100ML; MG/100ML; MG/100ML
9 INJECTION, SOLUTION INTRAVENOUS CONTINUOUS
Status: DISCONTINUED | OUTPATIENT
Start: 2021-10-22 | End: 2021-10-22 | Stop reason: HOSPADM

## 2021-10-22 RX ORDER — KETAMINE HYDROCHLORIDE 10 MG/ML
INJECTION INTRAMUSCULAR; INTRAVENOUS AS NEEDED
Status: DISCONTINUED | OUTPATIENT
Start: 2021-10-22 | End: 2021-10-22 | Stop reason: SURG

## 2021-10-22 RX ORDER — SODIUM CHLORIDE 0.9 % (FLUSH) 0.9 %
3-10 SYRINGE (ML) INJECTION AS NEEDED
Status: DISCONTINUED | OUTPATIENT
Start: 2021-10-22 | End: 2021-10-22 | Stop reason: HOSPADM

## 2021-10-22 RX ORDER — ONDANSETRON 2 MG/ML
INJECTION INTRAMUSCULAR; INTRAVENOUS AS NEEDED
Status: DISCONTINUED | OUTPATIENT
Start: 2021-10-22 | End: 2021-10-22 | Stop reason: SURG

## 2021-10-22 RX ORDER — FENTANYL CITRATE 50 UG/ML
100 INJECTION, SOLUTION INTRAMUSCULAR; INTRAVENOUS
Status: DISCONTINUED | OUTPATIENT
Start: 2021-10-22 | End: 2021-10-22 | Stop reason: HOSPADM

## 2021-10-22 RX ORDER — CEPHALEXIN 500 MG/1
500 CAPSULE ORAL EVERY 6 HOURS
Qty: 8 CAPSULE | Refills: 0 | Status: SHIPPED | OUTPATIENT
Start: 2021-10-22 | End: 2021-10-24

## 2021-10-22 RX ORDER — DEXAMETHASONE SODIUM PHOSPHATE 10 MG/ML
INJECTION INTRAMUSCULAR; INTRAVENOUS AS NEEDED
Status: DISCONTINUED | OUTPATIENT
Start: 2021-10-22 | End: 2021-10-22 | Stop reason: SURG

## 2021-10-22 RX ORDER — ASPIRIN 325 MG
325 TABLET, DELAYED RELEASE (ENTERIC COATED) ORAL DAILY
Qty: 30 TABLET | Refills: 1 | Status: SHIPPED | OUTPATIENT
Start: 2021-10-23 | End: 2021-12-06

## 2021-10-22 RX ORDER — MIDAZOLAM HYDROCHLORIDE 1 MG/ML
2 INJECTION INTRAMUSCULAR; INTRAVENOUS ONCE
Status: COMPLETED | OUTPATIENT
Start: 2021-10-22 | End: 2021-10-22

## 2021-10-22 RX ORDER — ONDANSETRON 2 MG/ML
4 INJECTION INTRAMUSCULAR; INTRAVENOUS ONCE AS NEEDED
Status: COMPLETED | OUTPATIENT
Start: 2021-10-22 | End: 2021-10-22

## 2021-10-22 RX ORDER — ROPIVACAINE HYDROCHLORIDE 2 MG/ML
INJECTION, SOLUTION EPIDURAL; INFILTRATION; PERINEURAL
Status: COMPLETED | OUTPATIENT
Start: 2021-10-22 | End: 2021-10-22

## 2021-10-22 RX ORDER — ROPIVACAINE HYDROCHLORIDE 5 MG/ML
INJECTION, SOLUTION EPIDURAL; INFILTRATION; PERINEURAL
Status: COMPLETED | OUTPATIENT
Start: 2021-10-22 | End: 2021-10-22

## 2021-10-22 RX ORDER — LIDOCAINE HYDROCHLORIDE 20 MG/ML
INJECTION, SOLUTION INFILTRATION; PERINEURAL AS NEEDED
Status: DISCONTINUED | OUTPATIENT
Start: 2021-10-22 | End: 2021-10-22 | Stop reason: SURG

## 2021-10-22 RX ORDER — EPHEDRINE SULFATE 50 MG/ML
INJECTION, SOLUTION INTRAVENOUS AS NEEDED
Status: DISCONTINUED | OUTPATIENT
Start: 2021-10-22 | End: 2021-10-22 | Stop reason: SURG

## 2021-10-22 RX ORDER — EPHEDRINE SULFATE 50 MG/ML
5 INJECTION, SOLUTION INTRAVENOUS ONCE AS NEEDED
Status: DISCONTINUED | OUTPATIENT
Start: 2021-10-22 | End: 2021-10-22 | Stop reason: HOSPADM

## 2021-10-22 RX ORDER — ROCURONIUM BROMIDE 10 MG/ML
INJECTION, SOLUTION INTRAVENOUS AS NEEDED
Status: DISCONTINUED | OUTPATIENT
Start: 2021-10-22 | End: 2021-10-22 | Stop reason: SURG

## 2021-10-22 RX ORDER — FENTANYL CITRATE 50 UG/ML
50 INJECTION, SOLUTION INTRAMUSCULAR; INTRAVENOUS
Status: DISCONTINUED | OUTPATIENT
Start: 2021-10-22 | End: 2021-10-22 | Stop reason: HOSPADM

## 2021-10-22 RX ORDER — OXYCODONE HYDROCHLORIDE AND ACETAMINOPHEN 5; 325 MG/1; MG/1
1-2 TABLET ORAL EVERY 4 HOURS PRN
Qty: 50 TABLET | Refills: 0 | Status: SHIPPED | OUTPATIENT
Start: 2021-10-22 | End: 2021-10-26 | Stop reason: SDUPTHER

## 2021-10-22 RX ORDER — LIDOCAINE HYDROCHLORIDE 10 MG/ML
0.5 INJECTION, SOLUTION EPIDURAL; INFILTRATION; INTRACAUDAL; PERINEURAL ONCE AS NEEDED
Status: DISCONTINUED | OUTPATIENT
Start: 2021-10-22 | End: 2021-10-22 | Stop reason: HOSPADM

## 2021-10-22 RX ORDER — HYDROCODONE BITARTRATE AND ACETAMINOPHEN 5; 325 MG/1; MG/1
1 TABLET ORAL ONCE AS NEEDED
Status: DISCONTINUED | OUTPATIENT
Start: 2021-10-22 | End: 2021-10-22 | Stop reason: HOSPADM

## 2021-10-22 RX ORDER — PROPOFOL 10 MG/ML
VIAL (ML) INTRAVENOUS AS NEEDED
Status: DISCONTINUED | OUTPATIENT
Start: 2021-10-22 | End: 2021-10-22 | Stop reason: SURG

## 2021-10-22 RX ORDER — SODIUM CHLORIDE 0.9 % (FLUSH) 0.9 %
3 SYRINGE (ML) INJECTION EVERY 12 HOURS SCHEDULED
Status: DISCONTINUED | OUTPATIENT
Start: 2021-10-22 | End: 2021-10-22 | Stop reason: HOSPADM

## 2021-10-22 RX ORDER — MIDAZOLAM HYDROCHLORIDE 1 MG/ML
1 INJECTION INTRAMUSCULAR; INTRAVENOUS
Status: DISCONTINUED | OUTPATIENT
Start: 2021-10-22 | End: 2021-10-22 | Stop reason: HOSPADM

## 2021-10-22 RX ORDER — FENTANYL CITRATE 50 UG/ML
INJECTION, SOLUTION INTRAMUSCULAR; INTRAVENOUS AS NEEDED
Status: DISCONTINUED | OUTPATIENT
Start: 2021-10-22 | End: 2021-10-22 | Stop reason: SURG

## 2021-10-22 RX ORDER — HYDROCODONE BITARTRATE AND ACETAMINOPHEN 7.5; 325 MG/1; MG/1
1 TABLET ORAL EVERY 4 HOURS PRN
Status: DISCONTINUED | OUTPATIENT
Start: 2021-10-22 | End: 2021-10-22 | Stop reason: HOSPADM

## 2021-10-22 RX ORDER — DEXAMETHASONE SODIUM PHOSPHATE 4 MG/ML
INJECTION, SOLUTION INTRA-ARTICULAR; INTRALESIONAL; INTRAMUSCULAR; INTRAVENOUS; SOFT TISSUE
Status: COMPLETED | OUTPATIENT
Start: 2021-10-22 | End: 2021-10-22

## 2021-10-22 RX ADMIN — PROPOFOL 200 MG: 10 INJECTION, EMULSION INTRAVENOUS at 08:53

## 2021-10-22 RX ADMIN — EPHEDRINE SULFATE 10 MG: 50 INJECTION INTRAVENOUS at 10:15

## 2021-10-22 RX ADMIN — SUCCINYLCHOLINE CHLORIDE 200 MG: 20 INJECTION, SOLUTION INTRAMUSCULAR; INTRAVENOUS; PARENTERAL at 07:31

## 2021-10-22 RX ADMIN — FENTANYL CITRATE 50 MCG: 50 INJECTION INTRAMUSCULAR; INTRAVENOUS at 09:28

## 2021-10-22 RX ADMIN — PHENYLEPHRINE HYDROCHLORIDE 0.5 MCG/KG/MIN: 10 INJECTION INTRAVENOUS at 08:14

## 2021-10-22 RX ADMIN — GLYCOPYRROLATE 0.2 MG: 0.2 INJECTION INTRAMUSCULAR; INTRAVENOUS at 06:44

## 2021-10-22 RX ADMIN — MIDAZOLAM 2 MG: 1 INJECTION INTRAMUSCULAR; INTRAVENOUS at 06:28

## 2021-10-22 RX ADMIN — FENTANYL CITRATE 50 MCG: 50 INJECTION INTRAMUSCULAR; INTRAVENOUS at 09:47

## 2021-10-22 RX ADMIN — ONDANSETRON 4 MG: 2 INJECTION INTRAMUSCULAR; INTRAVENOUS at 09:36

## 2021-10-22 RX ADMIN — ROCURONIUM BROMIDE 5 MG: 50 INJECTION INTRAVENOUS at 07:31

## 2021-10-22 RX ADMIN — SODIUM CHLORIDE, POTASSIUM CHLORIDE, SODIUM LACTATE AND CALCIUM CHLORIDE 9 ML/HR: 600; 310; 30; 20 INJECTION, SOLUTION INTRAVENOUS at 06:28

## 2021-10-22 RX ADMIN — FENTANYL CITRATE 50 MCG: 50 INJECTION INTRAMUSCULAR; INTRAVENOUS at 06:29

## 2021-10-22 RX ADMIN — LIDOCAINE HYDROCHLORIDE 100 MG: 20 INJECTION, SOLUTION INFILTRATION; PERINEURAL at 07:31

## 2021-10-22 RX ADMIN — EPHEDRINE SULFATE 10 MG: 50 INJECTION INTRAVENOUS at 10:10

## 2021-10-22 RX ADMIN — Medication 3 G: at 07:36

## 2021-10-22 RX ADMIN — DEXAMETHASONE SODIUM PHOSPHATE 4 MG: 4 INJECTION, SOLUTION INTRAMUSCULAR; INTRAVENOUS at 06:42

## 2021-10-22 RX ADMIN — SODIUM CHLORIDE, POTASSIUM CHLORIDE, SODIUM LACTATE AND CALCIUM CHLORIDE: 600; 310; 30; 20 INJECTION, SOLUTION INTRAVENOUS at 09:11

## 2021-10-22 RX ADMIN — EPHEDRINE SULFATE 10 MG: 50 INJECTION INTRAVENOUS at 07:56

## 2021-10-22 RX ADMIN — ONDANSETRON 4 MG: 2 INJECTION INTRAMUSCULAR; INTRAVENOUS at 12:26

## 2021-10-22 RX ADMIN — PROPOFOL 200 MG: 10 INJECTION, EMULSION INTRAVENOUS at 07:31

## 2021-10-22 RX ADMIN — DEXAMETHASONE SODIUM PHOSPHATE 10 MG: 10 INJECTION INTRAMUSCULAR; INTRAVENOUS at 07:48

## 2021-10-22 RX ADMIN — KETAMINE HYDROCHLORIDE 50 MG: 10 INJECTION INTRAMUSCULAR; INTRAVENOUS at 09:06

## 2021-10-22 RX ADMIN — EPHEDRINE SULFATE 10 MG: 50 INJECTION INTRAVENOUS at 09:54

## 2021-10-22 RX ADMIN — ROPIVACAINE HYDROCHLORIDE 10 ML: 2 INJECTION, SOLUTION EPIDURAL; INFILTRATION at 06:42

## 2021-10-22 RX ADMIN — EPHEDRINE SULFATE 10 MG: 50 INJECTION INTRAVENOUS at 08:05

## 2021-10-22 RX ADMIN — ROPIVACAINE HYDROCHLORIDE 20 ML: 5 INJECTION EPIDURAL; INFILTRATION; PERINEURAL at 06:42

## 2021-10-22 NOTE — ANESTHESIA PREPROCEDURE EVALUATION
Anesthesia Evaluation     Patient summary reviewed and Nursing notes reviewed   NPO Solid Status: > 8 hours             Airway   Mallampati: III  TM distance: >3 FB  Neck ROM: full  no difficulty expected  Dental - normal exam     Pulmonary - normal exam   (+) sleep apnea,   Cardiovascular - normal exam    (+) hypertension,       Neuro/Psych- negative ROS  GI/Hepatic/Renal/Endo    (+) morbid obesity,      Musculoskeletal (-) negative ROS    Abdominal  - normal exam   Substance History - negative use     OB/GYN negative ob/gyn ROS         Other                        Anesthesia Plan    ASA 3     general with block     intravenous induction     Anesthetic plan, all risks, benefits, and alternatives have been provided, discussed and informed consent has been obtained with: patient.    Plan discussed with CRNA.

## 2021-10-22 NOTE — ANESTHESIA POSTPROCEDURE EVALUATION
Patient: Vinnie Ventura    Procedure Summary     Date: 10/22/21 Room / [facility]:  [facility]    Anesthesia Start: 0724 Anesthesia Stop: 1022    Procedure: Left ANKLE OPEN REDUCTION INTERNAL FIXATION with bone and soft tissue repair with implants, Removal of loose body, Drilling of talus (Left Ankle) Diagnosis:       Closed displaced fracture of lateral malleolus of left fibula, initial encounter      Closed fracture of posterior malleolus of left tibia, initial encounter      Closed fracture of left ankle, initial encounter      Tear of deltoid ligament of left ankle, initial encounter      (Closed displaced fracture of lateral malleolus of left fibula, initial encounter [S82.62XA])      (Closed fracture of posterior malleolus of left tibia, initial encounter [S82.392A])      (Closed fracture of left ankle, initial encounter [S82.892A])      (Tear of deltoid ligament of left ankle, initial encounter [S93.422A])    Surgeons: Clark Serna MD Provider: Quinten Witt MD    Anesthesia Type: general with block ASA Status: 3          Anesthesia Type: general with block    Vitals  Vitals Value Taken Time   /68 10/22/21 1116   Temp 36.7 °C (98.1 °F) 10/22/21 1115   Pulse 93 10/22/21 1117   Resp 16 10/22/21 1115   SpO2 94 % 10/22/21 1115   Vitals shown include unvalidated device data.        Post Anesthesia Care and Evaluation    Patient location during evaluation: bedside  Patient participation: complete - patient participated  Level of consciousness: awake  Pain management: adequate  Airway patency: patent  Anesthetic complications: No anesthetic complications    Cardiovascular status: acceptable  Respiratory status: acceptable  Hydration status: acceptable    Comments: */68 (BP Location: Right arm, Patient Position: Lying)   Pulse 89   Temp 36.7 °C (98.1 °F) (Temporal)   Resp 16   Wt 133 kg (293 lb 3.4 oz)   SpO2 94%   BMI 39.77 kg/m²

## 2021-10-22 NOTE — ANESTHESIA PROCEDURE NOTES
Peripheral Block    Pre-sedation assessment completed: 10/22/2021 6:32 AM    Patient reassessed immediately prior to procedure    Patient location during procedure: pre-op  Start time: 10/22/2021 6:32 AM  Stop time: 10/22/2021 6:42 AM  Reason for block: at surgeon's request and post-op pain management  Performed by  Anesthesiologist: Quinten Witt MD  Preanesthetic Checklist  Completed: patient identified, IV checked, site marked, risks and benefits discussed, surgical consent, monitors and equipment checked, pre-op evaluation and timeout performed  Prep:  Pt Position: supine  Sterile barriers:cap, gloves, mask and sterile barriers  Prep: ChloraPrep  Patient monitoring: blood pressure monitoring, continuous pulse oximetry and EKG  Procedure    Sedation: yes  Performed under: local infiltration  Guidance:ultrasound guided    ULTRASOUND INTERPRETATION.  Using ultrasound guidance a 22 G gauge needle was placed in close proximity to the femoral and sciatic nerve, at which point, under ultrasound guidance anesthetic was injected in the area of the nerve and spread of the anesthesia was seen on ultrasound in close proximity thereto.  There were no abnormalities seen on ultrasound; a digital image was taken; and the patient tolerated the procedure with no complications. Images:still images obtained    Block Type:adductor canal block, popliteal and sciatic  Injection Technique:single-shot  Needle Type:echogenic  Needle Gauge:22 G  Resistance on Injection: less than 15 psi    Medications Used: dexamethasone (DECADRON) injection, 4 mg  ropivacaine (NAROPIN) 0.5 % injection, 20 mL  ropivacaine (NAROPIN) 0.2 % injection, 10 mL  Med administered at 10/22/2021 6:42 AM      Post Assessment  Injection Assessment: negative aspiration for heme, no paresthesia on injection and incremental injection  Patient Tolerance:comfortable throughout block  Complications:no  Additional Notes

## 2021-10-22 NOTE — ANESTHESIA PROCEDURE NOTES
Airway  Urgency: elective    Date/Time: 10/22/2021 7:35 AM  Airway not difficult    General Information and Staff    Patient location during procedure: OR  Anesthesiologist: Quinten Witt MD  CRNA: Victoria Noe CRNA    Indications and Patient Condition  Indications for airway management: airway protection    Preoxygenated: yes  Mask difficulty assessment: 1 - vent by mask    Final Airway Details  Final airway type: endotracheal airway      Successful airway: ETT  Cuffed: yes   Successful intubation technique: direct laryngoscopy  Endotracheal tube insertion site: oral  Blade: Talya  Blade size: 4  ETT size (mm): 8.0  Cormack-Lehane Classification: grade I - full view of glottis  Placement verified by: chest auscultation and capnometry   Cuff volume (mL): 9  Measured from: lips  ETT/EBT  to lips (cm): 23  Number of attempts at approach: 1  Assessment: lips, teeth, and gum same as pre-op and atraumatic intubation

## 2021-10-26 ENCOUNTER — TELEPHONE (OUTPATIENT)
Dept: ORTHOPEDIC SURGERY | Facility: CLINIC | Age: 51
End: 2021-10-26

## 2021-10-26 DIAGNOSIS — S82.392A CLOSED FRACTURE OF POSTERIOR MALLEOLUS OF LEFT TIBIA, INITIAL ENCOUNTER: ICD-10-CM

## 2021-10-26 DIAGNOSIS — S93.422A TEAR OF DELTOID LIGAMENT OF LEFT ANKLE, INITIAL ENCOUNTER: ICD-10-CM

## 2021-10-26 DIAGNOSIS — S82.62XA CLOSED DISPLACED FRACTURE OF LATERAL MALLEOLUS OF LEFT FIBULA, INITIAL ENCOUNTER: ICD-10-CM

## 2021-10-26 DIAGNOSIS — S82.892A CLOSED FRACTURE OF LEFT ANKLE, INITIAL ENCOUNTER: ICD-10-CM

## 2021-10-26 RX ORDER — OXYCODONE HYDROCHLORIDE AND ACETAMINOPHEN 5; 325 MG/1; MG/1
1-2 TABLET ORAL EVERY 4 HOURS PRN
Qty: 50 TABLET | Refills: 0 | Status: SHIPPED | OUTPATIENT
Start: 2021-10-26 | End: 2021-10-26 | Stop reason: SDUPTHER

## 2021-10-26 RX ORDER — OXYCODONE HYDROCHLORIDE AND ACETAMINOPHEN 5; 325 MG/1; MG/1
1-2 TABLET ORAL EVERY 4 HOURS PRN
Qty: 50 TABLET | Refills: 0 | Status: SHIPPED | OUTPATIENT
Start: 2021-10-26 | End: 2021-12-06

## 2021-10-26 RX ORDER — OXYCODONE HYDROCHLORIDE AND ACETAMINOPHEN 5; 325 MG/1; MG/1
1-2 TABLET ORAL EVERY 4 HOURS PRN
Qty: 50 TABLET | Refills: 0 | Status: CANCELLED | OUTPATIENT
Start: 2021-10-26

## 2021-10-26 NOTE — TELEPHONE ENCOUNTER
PATIENT CALLED TO ADVISE OFFICE THAT HIS PHARMACY ([facility]) CLOSED AFTER LUNCH TODAY WITH NO PRIOR NOTICE.  THEREFORE, PATIENT ASKED [facility] TO SEND REQUEST FOR SCRIPT SO THAT HE CAN GET A REFILL BEFORE END OF DAY.  PATIENT WILL RUN OUT OF MEDS (OXYCODONE 11PM TODAY)    UNABLE  TO WARM TRANSFER

## 2021-10-28 ENCOUNTER — TELEPHONE (OUTPATIENT)
Dept: ORTHOPEDIC SURGERY | Facility: CLINIC | Age: 51
End: 2021-10-28

## 2021-10-28 NOTE — TELEPHONE ENCOUNTER
ALEXI WITH [employer] CALLED WANTING TO KNOW THE DOS OF SURGERY CPP, ICD 10 CODE, INPT/OUTPT,  LEFT MESSAGE [phone] EXT [id number],LLR

## 2021-11-03 ENCOUNTER — OFFICE VISIT (OUTPATIENT)
Dept: ORTHOPEDIC SURGERY | Facility: CLINIC | Age: 51
End: 2021-11-03

## 2021-11-03 VITALS — RESPIRATION RATE: 18 BRPM | WEIGHT: 286 LBS | TEMPERATURE: 98.7 F | BODY MASS INDEX: 38.74 KG/M2 | HEIGHT: 72 IN

## 2021-11-03 DIAGNOSIS — S93.422D TEAR OF DELTOID LIGAMENT OF LEFT ANKLE, SUBSEQUENT ENCOUNTER: ICD-10-CM

## 2021-11-03 DIAGNOSIS — S82.62XD CLOSED DISPLACED FRACTURE OF LATERAL MALLEOLUS OF LEFT FIBULA WITH ROUTINE HEALING, SUBSEQUENT ENCOUNTER: Primary | ICD-10-CM

## 2021-11-03 DIAGNOSIS — M95.8 OSTEOCHONDRAL DEFECT OF TALUS: ICD-10-CM

## 2021-11-03 DIAGNOSIS — S82.392D CLOSED FRACTURE OF POSTERIOR MALLEOLUS OF LEFT TIBIA WITH ROUTINE HEALING, SUBSEQUENT ENCOUNTER: ICD-10-CM

## 2021-11-03 PROCEDURE — 73610 X-RAY EXAM OF ANKLE: CPT | Performed by: ORTHOPAEDIC SURGERY

## 2021-11-03 PROCEDURE — 99024 POSTOP FOLLOW-UP VISIT: CPT | Performed by: ORTHOPAEDIC SURGERY

## 2021-11-03 PROCEDURE — 29405 APPL SHORT LEG CAST: CPT | Performed by: ORTHOPAEDIC SURGERY

## 2021-11-03 NOTE — PROGRESS NOTES
"Ankle Follow Up      Patient: Vinnie HUYNH [place]    YOB: 1970 51 y.o. male    Chief Complaints: Ankle a little sore but doing okay    History of Present Illness: Patient follows up for operative treatment of his left ankle from 10/22/2021 with plating of his lateral malleolus and closed treatment of posterior malleolar fracture as well as anteromedial arthrotomy with removal of loose body with drilling of the medial talar chondral lesion and repair of deltoid with Arthrex fiber tack and syndesmotic stabilization using Arthrex tight rope.    He has been nonweightbearing and elevating and is on aspirin for DVT prophylaxis.  Symptoms are improving but he did have an episode the Tuesday after his surgery where he fell off his scooter onto his foot briefly.    He is down to only using some pain medication at night.  HPI    ROS: ankle pain no fevers chills chest pain or shortness of breath  Past Medical History:   Diagnosis Date   • Asymptomatic PVCs    • Closed fracture of left ankle 10/13/2021   • CTS (carpal tunnel syndrome) 1996   • Encounter for special screening examination for neoplasm of prostate     > 5 yrs ago   • Eye exam normal     > 5 yrs ago   • GERD (gastroesophageal reflux disease)    • Hypertension    • Left ankle pain    • Rotator cuff syndrome 2012    LEFT   • Sleep apnea     CPAP NOT USING DUE TO HAVING ISSUE WITH FITTING   • SVT (supraventricular tachycardia) ([facility])        Physical Exam:   Vitals:    11/03/21 1056   Resp: 18   Temp: 98.7 °F (37.1 °C)   Weight: 130 kg (286 lb)   Height: 182.9 cm (72\")     Well developed with normal mood.  Left ankle incisions are healing without sign of infection there is only mild swelling and no clear evidence of any breakdown.  He was grossly sensate light touch over the dorsum of the foot and left calf was nontender without sign of DVT      Radiology: 3 views of the left ankle ordered evaluate alignment reviewed and compared to previous x-rays.  Talus " remains well-seated within the mortise there is no widening of the syndesmosis.  There is evidence of the medial talar cartilaginous injury but overall joint appears congruent.      Assessment/Plan: Status post ORIF left ankle as outlined above with chondral injury medial talar dome treated with drilling.    I reviewed his pathology in detail with him and that he did have significant chondral injury to the talus that is going to predispose him to to increased arthritis in addition to the ankle fracture however there is nothing different to do for it at this time    Staples removed and Steri-Strips were applied clean dressings were placed and he was placed into very well-padded short leg nonweightbearing fiberglass cast and will continue with elevation and nonweightbearing.    We will continue weaning down on his pain medication but understands to avoid any ibuprofen Naprosyn Motrin Aleve etc. or any other anti-inflammatories other than Tylenol so as to minimize bone healing inhibition..    We will see him back in about 2 weeks with x-rays of his left ankle

## 2021-11-17 ENCOUNTER — OFFICE VISIT (OUTPATIENT)
Dept: ORTHOPEDIC SURGERY | Facility: CLINIC | Age: 51
End: 2021-11-17

## 2021-11-17 VITALS — WEIGHT: 286 LBS | TEMPERATURE: 97.5 F | HEIGHT: 72 IN | BODY MASS INDEX: 38.74 KG/M2

## 2021-11-17 DIAGNOSIS — T81.31XA POSTOPERATIVE WOUND DEHISCENCE, INITIAL ENCOUNTER: ICD-10-CM

## 2021-11-17 DIAGNOSIS — S82.392D CLOSED FRACTURE OF POSTERIOR MALLEOLUS OF LEFT TIBIA WITH ROUTINE HEALING, SUBSEQUENT ENCOUNTER: ICD-10-CM

## 2021-11-17 DIAGNOSIS — S82.62XD CLOSED DISPLACED FRACTURE OF LATERAL MALLEOLUS OF LEFT FIBULA WITH ROUTINE HEALING, SUBSEQUENT ENCOUNTER: Primary | ICD-10-CM

## 2021-11-17 DIAGNOSIS — M95.8 OSTEOCHONDRAL DEFECT OF TALUS: ICD-10-CM

## 2021-11-17 DIAGNOSIS — S93.422D TEAR OF DELTOID LIGAMENT OF LEFT ANKLE, SUBSEQUENT ENCOUNTER: ICD-10-CM

## 2021-11-17 PROCEDURE — 73610 X-RAY EXAM OF ANKLE: CPT | Performed by: ORTHOPAEDIC SURGERY

## 2021-11-17 PROCEDURE — 99024 POSTOP FOLLOW-UP VISIT: CPT | Performed by: ORTHOPAEDIC SURGERY

## 2021-11-17 NOTE — PROGRESS NOTES
"Ankle Follow Up      Patient: Vinnie HUYNH [place]    YOB: 1970 51 y.o. male    Chief Complaints: Ankle getting better  History of Present Illness:Patient follows up for operative treatment of his left ankle from 10/22/2021 with plating of his lateral malleolus and closed treatment of posterior malleolar fracture as well as anteromedial arthrotomy with removal of loose body with drilling of the medial talar chondral lesion and repair of deltoid with Arthrex fiber tack and syndesmotic stabilization using Arthrex tight rope.    Last seen on 11/3/2021 and was weaning down on his pain medication.  He was placed into a cast with instructions to continue with elevation and nonweightbearing and reviewed his operative findings with him at that time.    He has since been nonweightbearing elevating but had an second episode where he fell off his scooter again since her last visit briefly putting some weight on his ankle.  He reports that his pain is improving and is weaning down on pain medication and still gets some occasional sharp \"nerve pain\" going up his leg.  HPI    ROS: ankle pain  Past Medical History:   Diagnosis Date   • Asymptomatic PVCs    • Closed fracture of left ankle 10/13/2021   • CTS (carpal tunnel syndrome) 1996   • Encounter for special screening examination for neoplasm of prostate     > 5 yrs ago   • Eye exam normal     > 5 yrs ago   • GERD (gastroesophageal reflux disease)    • Hypertension    • Left ankle pain    • Rotator cuff syndrome 2012    LEFT   • Sleep apnea     CPAP NOT USING DUE TO HAVING ISSUE WITH FITTING   • SVT [facility]supraventricular tachycardia) ([facility])        Physical Exam:   Vitals:    11/17/21 1256   Temp: 97.5 °F (36.4 °C)   Weight: 130 kg (286 lb)   Height: 182.9 cm (72\")   PainSc:   5     Well developed with normal mood.  On exam his medial wound is healing well lateral wound is healing without sign of infection there was an area at the junction of the distal one third and " proximal two thirds over an area of about a centimeter half to 2 cm where there was some crusting removed with some superficial dehiscence beneath this that measured about 2 to 3 mm wide..  I did remove one small piece of Vicryl but there was no purulence and did not seem to probe deeply there was no drainage.  He had very limited discomfort with passive range of motion of the ankle.  Left calf was nontender without sign of DVT and was grossly sensate light touch throughout the left foot.      Radiology: 3 views of the left ankle ordered evaluate postoperative alignment reviewed and compared with previous x-rays.  Fibula fracture remains well aligned talus is well-seated within the mortise without medial widening or syndesmotic widening.  There is no change in the posterior malleolar fragment.      Assessment/Plan: 1 status post ORIF left ankle as outlined above with chondral injury medial talar dome treated with drilling.  2.  Left lateral ankle superficial wound dehiscence.    Reviewed treatment options going forward with him and thankfully do not see any sign of infection no exposed hardware and we will have him paint this area with Betadine twice daily and keep a clean dressing on it with Ace wraps and use boot when he is up and about to protect this.  At rest he may remove the boot for gentle range of motion exercises and sleep in an air stirrup brace.    He can let this get wet in the shower but understands not to immerse it.    Anything worsens he will let me know otherwise I will see him back in around 2 weeks with x-rays of his left ankle.

## 2021-11-22 ENCOUNTER — TELEPHONE (OUTPATIENT)
Dept: ORTHOPEDIC SURGERY | Facility: CLINIC | Age: 51
End: 2021-11-22

## 2021-11-22 DIAGNOSIS — G57.92 NEURITIS OF LEFT ANKLE: Primary | ICD-10-CM

## 2021-11-22 RX ORDER — OMEPRAZOLE 20 MG/1
20 CAPSULE, DELAYED RELEASE ORAL DAILY
Qty: 90 CAPSULE | Refills: 0 | Status: SHIPPED | OUTPATIENT
Start: 2021-11-22 | End: 2022-02-15 | Stop reason: SDUPTHER

## 2021-11-22 RX ORDER — GABAPENTIN 300 MG/1
300 CAPSULE ORAL
Qty: 30 CAPSULE | Refills: 1 | Status: SHIPPED | OUTPATIENT
Start: 2021-11-22 | End: 2022-01-17 | Stop reason: SDUPTHER

## 2021-11-22 NOTE — TELEPHONE ENCOUNTER
"Patient was seen last week and was having some persistent \"nerve pain\" that was going up his leg.  Said he still getting similar symptoms with some aching pain that goes up the side of his leg.  He was asking about possibly low-dose gabapentin to try to help with this as he said that his sister had had a similar problem that was treated as such with some relief.    I told him that would be fine we will send in the prescription for him discussed him possible sedative effects of gabapentin and he will use this at night before he goes to bed to start off with.  He appreciated the call and let me know if he needs anything further prior to follow-up.  "

## 2021-11-22 NOTE — TELEPHONE ENCOUNTER
Rx Refill Note  Requested Prescriptions     Pending Prescriptions Disp Refills   • omeprazole (priLOSEC) 20 MG capsule 90 capsule 0     Sig: Take 1 capsule by mouth Daily.      Last office visit with prescribing clinician: 05/27/2021     Next office visit with prescribing clinician: Visit date not found            Luis Toscano Rep  11/22/21, 10:45 EST

## 2021-12-06 ENCOUNTER — OFFICE VISIT (OUTPATIENT)
Dept: ORTHOPEDIC SURGERY | Facility: CLINIC | Age: 51
End: 2021-12-06

## 2021-12-06 VITALS — WEIGHT: 286 LBS | TEMPERATURE: 98.2 F | HEIGHT: 72 IN | BODY MASS INDEX: 38.74 KG/M2

## 2021-12-06 DIAGNOSIS — S82.62XD CLOSED DISPLACED FRACTURE OF LATERAL MALLEOLUS OF LEFT FIBULA WITH ROUTINE HEALING, SUBSEQUENT ENCOUNTER: ICD-10-CM

## 2021-12-06 DIAGNOSIS — S93.422D TEAR OF DELTOID LIGAMENT OF LEFT ANKLE, SUBSEQUENT ENCOUNTER: Primary | ICD-10-CM

## 2021-12-06 DIAGNOSIS — T81.31XD POSTOPERATIVE WOUND DEHISCENCE, SUBSEQUENT ENCOUNTER: ICD-10-CM

## 2021-12-06 DIAGNOSIS — G57.92 NEURITIS OF LEFT ANKLE: ICD-10-CM

## 2021-12-06 DIAGNOSIS — S82.392D CLOSED FRACTURE OF POSTERIOR MALLEOLUS OF LEFT TIBIA WITH ROUTINE HEALING, SUBSEQUENT ENCOUNTER: ICD-10-CM

## 2021-12-06 PROCEDURE — 73610 X-RAY EXAM OF ANKLE: CPT | Performed by: ORTHOPAEDIC SURGERY

## 2021-12-06 PROCEDURE — 99024 POSTOP FOLLOW-UP VISIT: CPT | Performed by: ORTHOPAEDIC SURGERY

## 2021-12-06 RX ORDER — CLINDAMYCIN HYDROCHLORIDE 150 MG/1
150 CAPSULE ORAL 3 TIMES DAILY
Qty: 30 CAPSULE | Refills: 1 | Status: SHIPPED | OUTPATIENT
Start: 2021-12-06 | End: 2021-12-16

## 2021-12-06 NOTE — PROGRESS NOTES
"Ankle Follow Up      Patient: Vinnie HUYNH [place]    YOB: 1970 51 y.o. male    Chief Complaints: Ankle getting better    History of Present Illness:Patient follows up for operative treatment of his left ankle from 10/22/2021 with plating of his lateral malleolus and closed treatment of posterior malleolar fracture as well as anteromedial arthrotomy with removal of loose body with drilling of the medial talar chondral lesion and repair of deltoid with Arthrex fiber tack and syndesmotic stabilization using Arthrex tight rope.    He was last seen on 11/17/2021 and been nonweightbearing and had had an episode where he fell off his scooter again since her previous visit and reported the pain was improving and was being done on his medication with some occasional sharp \"nerve pain\" going up his leg.  His wound showed some areas of resting superficial dehiscence but no obvious deep extension or sign of infection.  Instructions were given and use of the boot and maintain nonweightbearing but gentle range of motion exercises were mild.  He was also started on gabapentin 3 mg nightly.    The wound has continued to improve and not had any drainage from it his pain is improving as well still with some occasional sharp burning pain but the Neurontin helps with that at night.  HPI    ROS: ankle pain  Past Medical History:   Diagnosis Date   • Asymptomatic PVCs    • Closed fracture of left ankle 10/13/2021   • CTS (carpal tunnel syndrome) 1996   • Encounter for special screening examination for neoplasm of prostate     > 5 yrs ago   • Eye exam normal     > 5 yrs ago   • GERD (gastroesophageal reflux disease)    • Hypertension    • Left ankle pain    • Rotator cuff syndrome 2012    LEFT   • Sleep apnea     CPAP NOT USING DUE TO HAVING ISSUE WITH FITTING   • SVT (supraventricular tachycardia) ([facility])        Physical Exam:   Vitals:    12/06/21 0805   Temp: 98.2 °F (36.8 °C)   Weight: 130 kg (286 lb)   Height: 182.9 cm (72\") "   PainSc:   1     Well developed with normal mood.  Left ankle incision is healing well except for one small area in the central aspect with some crust that I removed.  Some slight induration to that area and I was able to probe this and remove a small piece of stitch but there was no purulence but some slight surrounding induration.  There was no exacerbation of pain with range of motion of the ankle was grossly sensate light touch of the dorsal lateral foot without mottling or hyperesthesia.      Radiology: 3 views of the left ankle ordered evaluate postoperative alignment reviewed and compared to previous x-rays these show the talus to be well-seated within the mortise with no widening of the syndesmosis or medial joint space fibula fracture appears to be healing in good alignment and there is no change in the posterior malleolar fracture which appears to be somewhat more coalesced than on previous views in the area of the medial talar dome is still evident but without joint incongruity noted.      Assessment/Plan: 1 status post ORIF left ankle as outlined above with chondral injury medial talar dome treated with drilling.  2.  Left lateral ankle superficial wound dehiscence with removal of stitch.     We discussed treatment options going forward a total skin gross sign of infection little concerned about the area of induration so we will put him on some clindamycin 150 mg p.o. every 8 hours for 10 days.  Continue with Betadine dressing changes on this and may do touchdown weightbearing with his boot and walker for the next week and then progressed to 50% weightbearing.    We will hold him off work for now and we will see him back in 2 weeks with x-rays of the left ankle but if anything worsens in the interim he will let me know.

## 2021-12-10 RX ORDER — SACCHAROMYCES BOULARDII 250 MG
250 CAPSULE ORAL 2 TIMES DAILY
Qty: 14 CAPSULE | Refills: 1 | Status: SHIPPED | OUTPATIENT
Start: 2021-12-10

## 2021-12-20 ENCOUNTER — OFFICE VISIT (OUTPATIENT)
Dept: ORTHOPEDIC SURGERY | Facility: CLINIC | Age: 51
End: 2021-12-20

## 2021-12-20 ENCOUNTER — TELEPHONE (OUTPATIENT)
Dept: ORTHOPEDIC SURGERY | Facility: CLINIC | Age: 51
End: 2021-12-20

## 2021-12-20 VITALS — HEIGHT: 72 IN | WEIGHT: 286 LBS | BODY MASS INDEX: 38.74 KG/M2 | TEMPERATURE: 97.1 F

## 2021-12-20 DIAGNOSIS — S82.62XD CLOSED DISPLACED FRACTURE OF LATERAL MALLEOLUS OF LEFT FIBULA WITH ROUTINE HEALING, SUBSEQUENT ENCOUNTER: Primary | ICD-10-CM

## 2021-12-20 DIAGNOSIS — G57.92 NEURITIS OF LEFT ANKLE: ICD-10-CM

## 2021-12-20 DIAGNOSIS — S82.392D CLOSED FRACTURE OF POSTERIOR MALLEOLUS OF LEFT TIBIA WITH ROUTINE HEALING, SUBSEQUENT ENCOUNTER: ICD-10-CM

## 2021-12-20 DIAGNOSIS — R52 PAIN: ICD-10-CM

## 2021-12-20 DIAGNOSIS — M95.8 OSTEOCHONDRAL DEFECT OF TALUS: ICD-10-CM

## 2021-12-20 DIAGNOSIS — S93.422D TEAR OF DELTOID LIGAMENT OF LEFT ANKLE, SUBSEQUENT ENCOUNTER: ICD-10-CM

## 2021-12-20 DIAGNOSIS — T81.31XD POSTOPERATIVE WOUND DEHISCENCE, SUBSEQUENT ENCOUNTER: ICD-10-CM

## 2021-12-20 PROCEDURE — 99024 POSTOP FOLLOW-UP VISIT: CPT | Performed by: ORTHOPAEDIC SURGERY

## 2021-12-20 PROCEDURE — 73610 X-RAY EXAM OF ANKLE: CPT | Performed by: ORTHOPAEDIC SURGERY

## 2021-12-20 NOTE — TELEPHONE ENCOUNTER
Caller: YOVANI  Relationship to Patient: SELF    Phone Number: [phone]  Reason for Call: PT CALLED STATING THAT SOMEBODY FROM OFFICE CALLED HIM AROUND 10:30 AND WAS RETURNING PHONE CALL. PLEASE ADVISE PT AT ABOVE PHONE NUMBER

## 2021-12-20 NOTE — PROGRESS NOTES
"Ankle Follow Up      Patient: Vinnie HUYNH [place]    YOB: 1970 51 y.o. male    Chief Complaints: Ankle getting better    History of Present Illness:Patient follows up for operative treatment of his left ankle from 10/22/2021 with plating of his lateral malleolus and closed treatment of posterior malleolar fracture as well as anteromedial arthrotomy with removal of loose body with drilling of the medial talar chondral lesion and repair of deltoid with Arthrex fiber tack and syndesmotic stabilization using Arthrex tight rope.     He was  seen on 11/17/2021 and been nonweightbearing and had had an episode where he fell off his scooter again since her previous visit and reported the pain was improving and was being done on his medication with some occasional sharp \"nerve pain\" going up his leg.  His wound showed some areas of resting superficial dehiscence but no obvious deep extension or sign of infection.  Instructions were given and use of the boot and maintain nonweightbearing but gentle range of motion exercises were mild.  He was also started on gabapentin 300 mg nightly.     He was seen on 12/6/2021 and wound had continued to improve and had not had any drainage from it.  His pain was improving as well still with some occasional sharp burning pain but the Neurontin was helping with that at night.  A small piece of stitch was removed from his wound at that time.    He was instructed on continue Betadine dressing changes and given the small area of induration was started on some clindamycin 150 mg p.o. every 8 hours for 10 days and allowed to progress gradually up to 50% weightbearing with his boot and walker.    He misunderstood my instructions and quit using the boot after last visit and used the walker up until 2 to 3 days ago then transitioned to a cane. Said his pain has improved to some degree still gets some occasional pain shooting up the side of his leg and intermittent feeling of thickness in " "his forefoot. The lateral wound has improved (he finished antibiotics but had some diarrhea and this slowly resolved after finishing antibiotics). His lateral wound  has a small area pf crusting but not any pain or drainage from it. Reports an occasional discomfort of the medial aspect of the ankle at the area of his suture button.      HPI    ROS: ankle pain no fevers chills chest pain or shortness of breath  Past Medical History:   Diagnosis Date   • Asymptomatic PVCs    • Closed fracture of left ankle 10/13/2021   • CTS (carpal tunnel syndrome) 1996   • Encounter for special screening examination for neoplasm of prostate     > 5 yrs ago   • Eye exam normal     > 5 yrs ago   • GERD (gastroesophageal reflux disease)    • Hypertension    • Left ankle pain    • Rotator cuff syndrome 2012    LEFT   • Sleep apnea     CPAP NOT USING DUE TO HAVING ISSUE WITH FITTING   • SVT (supraventricular tachycardia) ([facility])        Physical Exam:   Vitals:    12/20/21 0806   Temp: 97.1 °F (36.2 °C)   Weight: 130 kg (286 lb)   Height: 182.9 cm (72\")   PainSc:   4     Well developed with normal mood. On exam he is ambulating with a cane no other assistive device. Left ankle incisions are healing nicely now there is still a small superficial area of crust measuring about 2 mm x 3 mm but no surrounding induration but no focal tenderness to palpation at the lateral wound and no induration. He had no focal pain with range of motion of the ankle. He was grossly sensate to light touch in the saphenous l and superficial peroneal nerve distributions of the foot and ankle and was able to flex and extend his toes well unable to elicit any gross focal pain in the forefoot today.      Radiology: 3 views of the left ankle ordered evaluate postoperative alignment reviewed and compared to previous x-rays.  Fibula fracture appears to be healing in good alignment talus is well-seated within the mortise there is no widening of the syndesmosis and the " "posterior malleolar fracture appears to be somewhat more consolidated medial little sclerotic but joint is congruent and this involves a very minimal portion of the posterior aspect of the plafond.      Assessment/Plan:  1 status post ORIF left ankle as outlined above with chondral injury medial talar dome treated with drilling.  2.  Left lateral ankle superficial wound dehiscence with removal of stitch    His wound continues to improve and I do not see any obvious sign of infection. There is only minimal swelling and no induration.    Reviewed with him that I do not see anything different to do as far as the nerve pain going up his leg and this has improved to some degree and he said is about \"50-50\". He will continue with the gabapentin 300 mg nightly.    He obviously progress his weightbearing much quicker than I had recommended but thankfully appears to be healing but recommend that he use the air stirrup brace that he has been sleeping in for weightbearing activities for the ankle and continue with use of a cane in the contralateral hand we will get him started in some physical therapy.    He does not need to sleep in the air stirrup brace but if anything worsens with his wound he will let me know otherwise we will see him back in 3 weeks with x-rays of his left ankle.  "

## 2021-12-22 ENCOUNTER — TREATMENT (OUTPATIENT)
Dept: PHYSICAL THERAPY | Facility: CLINIC | Age: 51
End: 2021-12-22

## 2021-12-22 DIAGNOSIS — S82.392D CLOSED FRACTURE OF POSTERIOR MALLEOLUS OF LEFT TIBIA WITH ROUTINE HEALING, SUBSEQUENT ENCOUNTER: ICD-10-CM

## 2021-12-22 DIAGNOSIS — S82.62XD CLOSED DISPLACED FRACTURE OF LATERAL MALLEOLUS OF LEFT FIBULA WITH ROUTINE HEALING, SUBSEQUENT ENCOUNTER: ICD-10-CM

## 2021-12-22 DIAGNOSIS — S93.422D TEAR OF DELTOID LIGAMENT OF LEFT ANKLE, SUBSEQUENT ENCOUNTER: Primary | ICD-10-CM

## 2021-12-22 PROCEDURE — 97110 THERAPEUTIC EXERCISES: CPT | Performed by: PHYSICAL THERAPIST

## 2021-12-22 PROCEDURE — 97161 PT EVAL LOW COMPLEX 20 MIN: CPT | Performed by: PHYSICAL THERAPIST

## 2021-12-22 NOTE — PROGRESS NOTES
Physical Therapy Initial Evaluation and Plan of Care    Patient: Vinnie HUYNH [place]   : 1970  Diagnosis/ICD-10 Code:  Tear of deltoid ligament of left ankle, subsequent encounter [S93.422D]  Referring practitioner: Clark Serna*  Date of Initial Visit: 2021  Today's Date: 2021  Patient seen for 1 session         Visit Diagnoses:    ICD-10-CM ICD-9-CM   1. Tear of deltoid ligament of left ankle, subsequent encounter  S93.422D V58.89     845.01   2. Closed displaced fracture of lateral malleolus of left fibula with routine healing, subsequent encounter  S82.62XD V54.19   3. Closed fracture of posterior malleolus of left tibia with routine healing, subsequent encounter  S82.392D V54.19         Subjective Questionnaire: LEFS: 33      Subjective Evaluation    History of Present Illness  Date of onset: 10/11/2021  Date of surgery: 10/22/2021  Mechanism of injury: Patient injured the left ankle while cutting grass.  Patient's right LE slipped and went down the hill and the left leg stayed up top and rolled it somehow, but is unsure.    Patient had operative treatment of his left ankle on 10/22/2021 with plating of his lateral malleolus and closed treatment of posterior malleolar fracture as well as anteromedial arthrotomy with removal of loose body with drilling of the medial talar chondral lesion and repair of deltoid with Arthrex fiber tack and syndesmotic stabilization using Arthrex tight rope.    Patient was in an open cast for about 2 weeks, then a full cast for about 2 weeks, then boot for a couple of weeks.  Patient reports being without the boot for about 2 weeks and is currently putting about 50% plus weight through the left LE.      Patient Occupation: Nursing Assistant at [employer]   Precautions and Work Restrictions: currently offPain  Current pain ratin  At worst pain ratin  Location: left medial ankle, lateral ankle, ball of foot and to the gastroc at times  Quality:  sharp, throbbing and dull ache (shooting)  Aggravating factors: ambulation and standing  Progression: improved             Objective          Observations     Additional Ankle/Foot Observation Details  Patient ambulates with a quad cane on his right side with an antalgic gait pattern.  Incision is intact, small unhealed area at the distal lateral incision.    Tenderness     Additional Tenderness Details  TTP to the left ATFL region.    Active Range of Motion   Left Ankle/Foot   Plantar flexion: 54 degrees   Inversion: 16 degrees   Eversion: 2 degrees     Additional Active Range of Motion Details  Left Ankle DF AROM: -18    Strength/Myotome Testing     Additional Strength Details  N/T at this time.          Assessment & Plan     Assessment  Impairments: abnormal gait, abnormal or restricted ROM, activity intolerance, impaired balance, impaired physical strength, lacks appropriate home exercise program and pain with function    Assessment details: Patient presents with c/o pain, TTP, limited AROM, decreased strength and an antalgic gait pattern which is limiting his ability to perform ADL'S.    Barriers to therapy: none  Prognosis: good  Prognosis details: STG's   1)  Independent with HEP  2)  Decrease pain by 50% or more  3)  AROM left ankle DF to -10  4)  AROM left ankle eversion to 8    LTG's   1)  Independent with HEP progression  2)  Decrease pain by 75% or more  3)  Increase strength for the left ankle to 4-/5 or more  4)  AROM left ankle DF to -5  5)  Min to no TTP present  6)  AROM left ankle inversion to 25  7)  AROM left ankle eversion to 15      Plan  Therapy options: will be seen for skilled therapy services  Planned modality interventions: cryotherapy  Planned therapy interventions: strengthening, stretching, therapeutic activities, home exercise program, gait training, manual therapy and neuromuscular re-education  Treatment plan discussed with: patient            Timed:         Manual Therapy:    0      mins  63916;     Therapeutic Exercise:    23     mins  89061;     Neuromuscular [place]:    0    mins  99599;    Therapeutic Activity:     0     mins  18767;     Gait Trainin     mins  79302;     Ultrasound:     0     mins  81661;          Un-Timed:  Electrical Stimulation:    0     mins  94974 ( );    Low Eval     10     Mins  91732  Mod Eval     0     Mins  05554  High Eval                       0     Mins  98393        Timed Treatment:   23   mins   Total Treatment:     33   mins          PT: Jeffrey Louis PT     [place] License [id number]  Electronically signed by Jeffrey Louis PT, 21, 7:00 AM EST    Certification Period: 2021 thru 3/21/2022  I certify that the therapy services are furnished while this patient is under my care.  The services outlined above are required by this patient, and will be reviewed every 90 days.         Physician Signature:__________________________________________________    PHYSICIAN: Clark Serna MD      DATE:     Please sign and return via fax to [email or web address] . Thank you, [facility].

## 2021-12-30 ENCOUNTER — TREATMENT (OUTPATIENT)
Dept: PHYSICAL THERAPY | Facility: CLINIC | Age: 51
End: 2021-12-30

## 2021-12-30 DIAGNOSIS — S93.422D TEAR OF DELTOID LIGAMENT OF LEFT ANKLE, SUBSEQUENT ENCOUNTER: Primary | ICD-10-CM

## 2021-12-30 DIAGNOSIS — S82.392D CLOSED FRACTURE OF POSTERIOR MALLEOLUS OF LEFT TIBIA WITH ROUTINE HEALING, SUBSEQUENT ENCOUNTER: ICD-10-CM

## 2021-12-30 DIAGNOSIS — S82.62XD CLOSED DISPLACED FRACTURE OF LATERAL MALLEOLUS OF LEFT FIBULA WITH ROUTINE HEALING, SUBSEQUENT ENCOUNTER: ICD-10-CM

## 2021-12-30 PROCEDURE — 97110 THERAPEUTIC EXERCISES: CPT | Performed by: PHYSICAL THERAPIST

## 2021-12-30 PROCEDURE — 97530 THERAPEUTIC ACTIVITIES: CPT | Performed by: PHYSICAL THERAPIST

## 2021-12-30 NOTE — PROGRESS NOTES
Physical Therapy Daily Treatment Note      Patient: Vinnie HUYNH [place]   : 1970  Referring practitioner: Clark Serna*  Date of Initial Visit: Type: THERAPY  Noted: 2021  Today's Date: 2021  Patient seen for 2 sessions       Visit Diagnoses:    ICD-10-CM ICD-9-CM   1. Tear of deltoid ligament of left ankle, subsequent encounter  S93.422D V58.89     845.01   2. Closed displaced fracture of lateral malleolus of left fibula with routine healing, subsequent encounter  S82.62XD V54.19   3. Closed fracture of posterior malleolus of left tibia with routine healing, subsequent encounter  S82.392D V54.19         Subjective Patient reports that the ankle is doing good, rates the pain at 1/10.    Objective   See Exercise, Manual, and Modality Logs for complete treatment.       Assessment/Plan  Subjective reports of pain remain low.  Function is improving as indicative by his ability to go to the grocery without as much discomfort.  Added staggered weight shifts to help with the gait pattern.  Patient tolerated the progression of exercises very well, no reports of pain with the increase in routine.      Timed:         Manual Therapy:    0     mins  35416;     Therapeutic Exercise:    38     mins  62958;     Neuromuscular [place]:    0    mins  76927;    Therapeutic Activity:     8     mins  74140;     Gait Trainin     mins  54068;     Ultrasound:     0     mins  54439;    Work Conditioning      __0_  mins  92880      Un-Timed:  Electrical Stimulation:    0     mins  45323 ( );        Timed Treatment:   46   mins   Total Treatment:     46   mins    Jeffrey Louis, PT  [place] License: [id number]

## 2022-01-03 ENCOUNTER — TELEPHONE (OUTPATIENT)
Dept: ORTHOPEDIC SURGERY | Facility: CLINIC | Age: 52
End: 2022-01-03

## 2022-01-03 NOTE — TELEPHONE ENCOUNTER
Caller:YOVANI CAR     Relationship:SELF     Best call back number: [phone]    What form or medical record are you requesting: SHORT TERM DISABILITY/MATRIX    Who is requesting this form or medical record from you: HIREN SIMPSON    How would you like to receive the form or medical records (pick-up, mail, fax): FAX  If fax, what is the fax number: [phone]      Timeframe paperwork needed: ASAP    Additional notes: FORMS WERE FAXED ON 12/06/2021

## 2022-01-06 ENCOUNTER — TREATMENT (OUTPATIENT)
Dept: PHYSICAL THERAPY | Facility: CLINIC | Age: 52
End: 2022-01-06

## 2022-01-06 DIAGNOSIS — S82.62XD CLOSED DISPLACED FRACTURE OF LATERAL MALLEOLUS OF LEFT FIBULA WITH ROUTINE HEALING, SUBSEQUENT ENCOUNTER: ICD-10-CM

## 2022-01-06 DIAGNOSIS — S82.392D CLOSED FRACTURE OF POSTERIOR MALLEOLUS OF LEFT TIBIA WITH ROUTINE HEALING, SUBSEQUENT ENCOUNTER: ICD-10-CM

## 2022-01-06 DIAGNOSIS — S93.422D TEAR OF DELTOID LIGAMENT OF LEFT ANKLE, SUBSEQUENT ENCOUNTER: Primary | ICD-10-CM

## 2022-01-06 PROCEDURE — 97110 THERAPEUTIC EXERCISES: CPT | Performed by: PHYSICAL THERAPIST

## 2022-01-06 PROCEDURE — 97530 THERAPEUTIC ACTIVITIES: CPT | Performed by: PHYSICAL THERAPIST

## 2022-01-06 NOTE — PROGRESS NOTES
Physical Therapy Daily Treatment Note      Patient: Vinnie HUYNH [place]   : 1970  Referring practitioner: Clark Serna*  Date of Initial Visit: Type: THERAPY  Noted: 2021  Today's Date: 2022  Patient seen for 3 sessions       Visit Diagnoses:    ICD-10-CM ICD-9-CM   1. Tear of deltoid ligament of left ankle, subsequent encounter  S93.422D V58.89     845.01   2. Closed displaced fracture of lateral malleolus of left fibula with routine healing, subsequent encounter  S82.62XD V54.19   3. Closed fracture of posterior malleolus of left tibia with routine healing, subsequent encounter  S82.392D V54.19         Subjective Patient reports that the ankle has been more sore this week, which may be because he has been doing more on it.    Objective   See Exercise, Manual, and Modality Logs for complete treatment.       Assessment/Plan  Function continues to improve.  Added PF, ever and inv with the theraband and standing HR/TR, all of which the patient tolerated without a significant increase in pain.  Plan to progress the closed chain activities as the patient can tolerate.      Timed:         Manual Therapy:    0     mins  33375;     Therapeutic Exercise:    29     mins  67402;     Neuromuscular [place]:    0    mins  99403;    Therapeutic Activity:     10     mins  01494;     Gait Trainin     mins  69970;     Ultrasound:     0     mins  16148;    Work Conditioning      __0_  mins  61015      Un-Timed:  Electrical Stimulation:    0     mins  11930 ( );        Timed Treatment:   39   mins   Total Treatment:     39   mins    Jeffrey Louis, PT  [place] License: [id number]

## 2022-01-12 ENCOUNTER — TREATMENT (OUTPATIENT)
Dept: PHYSICAL THERAPY | Facility: CLINIC | Age: 52
End: 2022-01-12

## 2022-01-12 ENCOUNTER — OFFICE VISIT (OUTPATIENT)
Dept: ORTHOPEDIC SURGERY | Facility: CLINIC | Age: 52
End: 2022-01-12

## 2022-01-12 VITALS — TEMPERATURE: 97.8 F | WEIGHT: 286 LBS | BODY MASS INDEX: 38.74 KG/M2 | HEIGHT: 72 IN

## 2022-01-12 DIAGNOSIS — R52 PAIN: Primary | ICD-10-CM

## 2022-01-12 DIAGNOSIS — T81.31XD POSTOPERATIVE WOUND DEHISCENCE, SUBSEQUENT ENCOUNTER: ICD-10-CM

## 2022-01-12 DIAGNOSIS — S93.422D TEAR OF DELTOID LIGAMENT OF LEFT ANKLE, SUBSEQUENT ENCOUNTER: ICD-10-CM

## 2022-01-12 DIAGNOSIS — M95.8 OSTEOCHONDRAL DEFECT OF TALUS: ICD-10-CM

## 2022-01-12 DIAGNOSIS — S82.62XD CLOSED DISPLACED FRACTURE OF LATERAL MALLEOLUS OF LEFT FIBULA WITH ROUTINE HEALING, SUBSEQUENT ENCOUNTER: ICD-10-CM

## 2022-01-12 DIAGNOSIS — S82.392D CLOSED FRACTURE OF POSTERIOR MALLEOLUS OF LEFT TIBIA WITH ROUTINE HEALING, SUBSEQUENT ENCOUNTER: ICD-10-CM

## 2022-01-12 DIAGNOSIS — G57.92 NEURITIS OF LEFT ANKLE: ICD-10-CM

## 2022-01-12 PROCEDURE — 99024 POSTOP FOLLOW-UP VISIT: CPT | Performed by: ORTHOPAEDIC SURGERY

## 2022-01-12 PROCEDURE — 97110 THERAPEUTIC EXERCISES: CPT | Performed by: PHYSICAL THERAPIST

## 2022-01-12 PROCEDURE — 97530 THERAPEUTIC ACTIVITIES: CPT | Performed by: PHYSICAL THERAPIST

## 2022-01-12 PROCEDURE — 73610 X-RAY EXAM OF ANKLE: CPT | Performed by: ORTHOPAEDIC SURGERY

## 2022-01-12 NOTE — PROGRESS NOTES
Physical Therapy Daily Treatment Note      Patient: Vinnie HUYNH [place]   : 1970  Referring practitioner: Clark Serna*  Date of Initial Visit: Type: THERAPY  Noted: 2021  Today's Date: 2022  Patient seen for 4 sessions       Visit Diagnoses:  No diagnosis found.        Subjective Patient rates the pain at 1/10.    Objective   See Exercise, Manual, and Modality Logs for complete treatment.       Assessment/Plan  Subjective reports of pain remain low.  Patient ambulates with a minimal antalgic gait pattern with the use of the quad cane.  Patient continues to progress nicely.  Patient tolerated the exercise routine without an increase in pain in the ankle.  Plan to progress the weight bearing activities as the patient can tolerate.      Timed:         Manual Therapy:    0     mins  72255;     Therapeutic Exercise:    33     mins  59018;    Neuromuscular [place]:    0    mins  72343;    Therapeutic Activity:     8     mins  43321;     Gait Trainin     mins  99336;     Ultrasound:     0     mins  97412;    Work Conditioning      __0_  mins  69112      Un-Timed:  Electrical Stimulation:    0     mins  43391 ( );        Timed Treatment:   41   mins   Total Treatment:     41   mins    Jeffrey Louis, PT  [place] License: [id number]

## 2022-01-12 NOTE — PROGRESS NOTES
"Ankle Follow Up      Patient: Vinnie HUYNH [place]    YOB: 1970 51 y.o. male    Chief Complaints: Ankle \"feels better\"    History of Present Illness:Patient follows up for operative treatment of his left ankle from 10/22/2021 with plating of his lateral malleolus and closed treatment of posterior malleolar fracture as well as anteromedial arthrotomy with removal of loose body with drilling of the medial talar chondral lesion and repair of deltoid with Arthrex fiber tack and syndesmotic stabilization using Arthrex tight rope.     He was  seen on 11/17/2021 and been nonweightbearing and had had an episode where he fell off his scooter again since her previous visit and reported the pain was improving and was being done on his medication with some occasional sharp \"nerve pain\" going up his leg.  His wound showed some areas of resting superficial dehiscence but no obvious deep extension or sign of infection.  Instructions were given and use of the boot and maintain nonweightbearing but gentle range of motion exercises were mild.  He was also started on gabapentin 300 mg nightly.     He was seen on 12/6/2021 and wound had continued to improve and had not had any drainage from it.  His pain was improving as well still with some occasional sharp burning pain but the Neurontin was helping with that at night.  A small piece of stitch was removed from his wound at that time.     He was instructed on continue Betadine dressing changes and given the small area of induration was started on some clindamycin 150 mg p.o. every 8 hours for 10 days and allowed to progress gradually up to 50% weightbearing with his boot and walker.     Was seen on 12/20/2021 and had misunderstood my instructions and quit using the boot after previous visit and used the walker up until 2 to 3 days prior to that visit when he transitioned to a cane.  He stated that his pain had improved to some degree still got some occasional pain shooting up " "the side of his leg and intermittent feeling of thickness in his forefoot. The lateral wound had improved (he finished antibiotics but had some diarrhea and this slowly resolved after finishing antibiotics). His lateral wound   had a small area pf crusting but not any pain or drainage from it.  He reported an occasional discomfort of the medial aspect of the ankle at the area of his suture button.    At that time his wound showed no sign of infection and he was instructed on progressing with weightbearing and using an air stirrup brace that he been using for sleeping and get started in physical therapy.    He is seen back today and wound has now resolved and still gets an occasional feeling of burning in the dorsal lateral aspect of his foot but only after therapy now and this has improved quite a bit.  He remains on gabapentin 300 mg nightly and also takes some after he is done therapy which helps with the neuritic symptoms which again have improved and are only associated with therapy.      HPI    ROS: ankle pain  Past Medical History:   Diagnosis Date   • Asymptomatic PVCs    • Closed fracture of left ankle 10/13/2021   • CTS (carpal tunnel syndrome) 1996   • Encounter for special screening examination for neoplasm of prostate     > 5 yrs ago   • Eye exam normal     > 5 yrs ago   • GERD (gastroesophageal reflux disease)    • Hypertension    • Left ankle pain    • Rotator cuff syndrome 2012    LEFT   • Sleep apnea     CPAP NOT USING DUE TO HAVING ISSUE WITH FITTING   • SVT (supraventricular tachycardia) ([facility])        Physical Exam:   Vitals:    01/12/22 0928   Temp: 97.8 °F (36.6 °C)   Weight: 130 kg (286 lb)   Height: 182.9 cm (72\")   PainSc:   2     Well developed with normal mood.  On exam today he came in the room and he was sitting crosslegged at with his foot turned inward without complaints of pain.  His incisions appear to be healed without sign of infection and was grossly sensate light touch over the " dorsal lateral foot with no mottling or hyperesthesia.  I was unable to exacerbate any pain with range of motion or external rotation testing of the ankle.      Radiology: 3 views of the left ankle ordered evaluate fracture and postoperative alignment reviewed and compared to previous x-rays.  Fibula fracture appears to be healing in good alignment there is no widening of the syndesmosis and talus is well-seated without medial widening.  The posterior malleolar fracture line is still somewhat evident but comprises only a very small part of the posterior plafond.      Assessment/Plan 1 status post ORIF left ankle as outlined above with chondral injury medial talar dome treated with drilling.  2.  Left lateral ankle resolved superficial wound dehiscence with removal of stitch    Overall he seems to be improving and we will let him start weaning out of the air stirrup brace around the house over the next 10 days or so and then gradually out of the house.  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    He will continue with physical therapy as well as gabapentin and we will see him back in 3 to 4 weeks with x-rays of his left ankle.  If he is improving at that time we will start weaning him off the gabapentin.  Answers for HPI/ROS submitted by the patient on 1/11/2022  What is the primary reason for your visit?: Lower Extremity Injury  Incident occurred: more than 1 week ago  Incident location: in the yard  Injury mechanism: a twisting injury  Pain location: left ankle  Pain quality: aching  Pain - numeric: 1/10  Pain course: intermittent  tingling: Yes  inability to bear weight: No  loss of motion: Yes  loss of sensation: No  muscle weakness: No  Foreign body present: no foreign bodies

## 2022-01-15 ENCOUNTER — PATIENT MESSAGE (OUTPATIENT)
Dept: ORTHOPEDIC SURGERY | Facility: CLINIC | Age: 52
End: 2022-01-15

## 2022-01-17 ENCOUNTER — TELEPHONE (OUTPATIENT)
Dept: ORTHOPEDIC SURGERY | Facility: CLINIC | Age: 52
End: 2022-01-17

## 2022-01-17 DIAGNOSIS — G57.92 NEURITIS OF LEFT ANKLE: ICD-10-CM

## 2022-01-17 RX ORDER — GABAPENTIN 300 MG/1
300 CAPSULE ORAL
Qty: 30 CAPSULE | Refills: 1 | Status: SHIPPED | OUTPATIENT
Start: 2022-01-17

## 2022-01-17 NOTE — TELEPHONE ENCOUNTER
----- Message from Vinnie Ventura sent at 1/15/2022  5:36 PM EST -----  Regarding: Gabapentin   Dr Serna I wanted to see if it was possible to get a refill on my gabapentin.  It helps when my foot is worked a lot after therapy and I have been taking it near bed so the foot doesn’t bother me and at times get that achy tingling feeling after walking on it and doing exercises throughout the day.  Thank you, Vinnie Ventura

## 2022-01-18 RX ORDER — LISINOPRIL 10 MG/1
10 TABLET ORAL DAILY
Qty: 90 TABLET | Refills: 0 | Status: SHIPPED | OUTPATIENT
Start: 2022-01-18 | End: 2022-04-20 | Stop reason: SDUPTHER

## 2022-01-19 ENCOUNTER — TREATMENT (OUTPATIENT)
Dept: PHYSICAL THERAPY | Facility: CLINIC | Age: 52
End: 2022-01-19

## 2022-01-19 DIAGNOSIS — S93.422D TEAR OF DELTOID LIGAMENT OF LEFT ANKLE, SUBSEQUENT ENCOUNTER: ICD-10-CM

## 2022-01-19 DIAGNOSIS — S82.62XD CLOSED DISPLACED FRACTURE OF LATERAL MALLEOLUS OF LEFT FIBULA WITH ROUTINE HEALING, SUBSEQUENT ENCOUNTER: Primary | ICD-10-CM

## 2022-01-19 DIAGNOSIS — S82.392D CLOSED FRACTURE OF POSTERIOR MALLEOLUS OF LEFT TIBIA WITH ROUTINE HEALING, SUBSEQUENT ENCOUNTER: ICD-10-CM

## 2022-01-19 PROCEDURE — 97110 THERAPEUTIC EXERCISES: CPT | Performed by: PHYSICAL THERAPIST

## 2022-01-19 PROCEDURE — 97530 THERAPEUTIC ACTIVITIES: CPT | Performed by: PHYSICAL THERAPIST

## 2022-01-19 NOTE — PROGRESS NOTES
Physical Therapy Daily Treatment Note      Patient: Vinnie HUYNH [place]   : 1970  Referring practitioner: Clark Serna*  Date of Initial Visit: Type: THERAPY  Noted: 2021  Today's Date: 2022  Patient seen for 5 sessions       Visit Diagnoses:    ICD-10-CM ICD-9-CM   1. Closed displaced fracture of lateral malleolus of left fibula with routine healing, subsequent encounter  S82.62XD V54.19   2. Tear of deltoid ligament of left ankle, subsequent encounter  S93.422D V58.89     845.01   3. Closed fracture of posterior malleolus of left tibia with routine healing, subsequent encounter  S82.392D V54.19     654    Subjective Patient reports that he worked four 12 hour shifts (Thursday-); rates the pain at 2/10.    Objective   See Exercise, Manual, and Modality Logs for complete treatment.       Assessment/Plan  Subjective reports of pain remain low.  Patient ambulated in the clinic without the cane and without the ankle brace with a minimal to moderate antalgic gait pattern.  Patient tolerated the progression of weight bearing activities well, reported some pain in the medial aspect of the left ankle.        Timed:         Manual Therapy:    0     mins  51207;     Therapeutic Exercise:    10     mins  84489;     Neuromuscular [place]:    0    mins  18648;    Therapeutic Activity:     30     mins  73774;     Gait Trainin     mins  75432;     Ultrasound:     0     mins  28761;    Work Conditioning      __0_  mins  10732      Un-Timed:  Electrical Stimulation:    0     mins  11319 ( );        Timed Treatment:   40   mins   Total Treatment:     40   mins    Jeffrey Louis, PT  [place] License: [id number]

## 2022-01-26 ENCOUNTER — TREATMENT (OUTPATIENT)
Dept: PHYSICAL THERAPY | Facility: CLINIC | Age: 52
End: 2022-01-26

## 2022-01-26 DIAGNOSIS — S82.62XD CLOSED DISPLACED FRACTURE OF LATERAL MALLEOLUS OF LEFT FIBULA WITH ROUTINE HEALING, SUBSEQUENT ENCOUNTER: Primary | ICD-10-CM

## 2022-01-26 DIAGNOSIS — S82.392D CLOSED FRACTURE OF POSTERIOR MALLEOLUS OF LEFT TIBIA WITH ROUTINE HEALING, SUBSEQUENT ENCOUNTER: ICD-10-CM

## 2022-01-26 DIAGNOSIS — S93.422D TEAR OF DELTOID LIGAMENT OF LEFT ANKLE, SUBSEQUENT ENCOUNTER: ICD-10-CM

## 2022-01-26 PROCEDURE — 97110 THERAPEUTIC EXERCISES: CPT | Performed by: PHYSICAL THERAPIST

## 2022-01-26 PROCEDURE — 97530 THERAPEUTIC ACTIVITIES: CPT | Performed by: PHYSICAL THERAPIST

## 2022-01-26 NOTE — PROGRESS NOTES
Physical Therapy Daily Treatment Note      Patient: Vinnie HUYNH [place]   : 1970  Referring practitioner: Clark Serna*  Date of Initial Visit: Type: THERAPY  Noted: 2021  Today's Date: 2022  Patient seen for 6 sessions       Visit Diagnoses:    ICD-10-CM ICD-9-CM   1. Closed displaced fracture of lateral malleolus of left fibula with routine healing, subsequent encounter  S82.62XD V54.19   2. Tear of deltoid ligament of left ankle, subsequent encounter  S93.422D V58.89     845.01   3. Closed fracture of posterior malleolus of left tibia with routine healing, subsequent encounter  S82.392D V54.19         Subjective Patient reports pain rated at /10.    Objective   See Exercise, Manual, and Modality Logs for complete treatment.       Assessment/Plan  Subjective reports of pain remain low.  Patient continues to ambulate with a minimal antalgic gait pattern with the left foot ER.  Patient tolerated the exercise progressions very well, no significant reports or pain with the routine.  Proprioception continues to be decreased with SLS.      Timed:         Manual Therapy:    0     mins  26518;     Therapeutic Exercise:    8     mins  35806;     Neuromuscular [place]:    0    mins  07595;    Therapeutic Activity:     30     mins  10592;     Gait Trainin     mins  29761;     Ultrasound:     0     mins  66376;    Work Conditioning      __0_  mins  59000      Un-Timed:  Electrical Stimulation:    0     mins  41173 ( );        Timed Treatment:   38   mins   Total Treatment:     38   mins    Jeffrey Louis, PT  [place] License: [id number]

## 2022-02-02 ENCOUNTER — TREATMENT (OUTPATIENT)
Dept: PHYSICAL THERAPY | Facility: CLINIC | Age: 52
End: 2022-02-02

## 2022-02-02 DIAGNOSIS — S82.392D CLOSED FRACTURE OF POSTERIOR MALLEOLUS OF LEFT TIBIA WITH ROUTINE HEALING, SUBSEQUENT ENCOUNTER: ICD-10-CM

## 2022-02-02 DIAGNOSIS — S82.62XD CLOSED DISPLACED FRACTURE OF LATERAL MALLEOLUS OF LEFT FIBULA WITH ROUTINE HEALING, SUBSEQUENT ENCOUNTER: Primary | ICD-10-CM

## 2022-02-02 DIAGNOSIS — S93.422D TEAR OF DELTOID LIGAMENT OF LEFT ANKLE, SUBSEQUENT ENCOUNTER: ICD-10-CM

## 2022-02-02 PROCEDURE — 97530 THERAPEUTIC ACTIVITIES: CPT | Performed by: PHYSICAL THERAPIST

## 2022-02-02 PROCEDURE — 97110 THERAPEUTIC EXERCISES: CPT | Performed by: PHYSICAL THERAPIST

## 2022-02-02 NOTE — PROGRESS NOTES
Physical Therapy Daily Treatment Note      Patient: Vinnie HUYNH [place]   : 1970  Referring practitioner: Clark Serna*  Date of Initial Visit: Type: THERAPY  Noted: 2021  Today's Date: 2022  Patient seen for 7 sessions       Visit Diagnoses:    ICD-10-CM ICD-9-CM   1. Closed displaced fracture of lateral malleolus of left fibula with routine healing, subsequent encounter  S82.62XD V54.19   2. Tear of deltoid ligament of left ankle, subsequent encounter  S93.422D V58.89     845.01   3. Closed fracture of posterior malleolus of left tibia with routine healing, subsequent encounter  S82.392D V54.19       Subjective Patient reports that the ankle isn't to bad today, states that he walked up the hill in his neighborhood yesterday for the first time; rates the pain at less than a 1/10.    Objective   See Exercise, Manual, and Modality Logs for complete treatment.     LEFS Score 41    Assessment/Plan  Subjective reports of pain continues to be low.  Function continue to improve with regard to his ability to walk more.  Patient continues to require the use of a couple fingers on the balance bar for SLS.  Patient did very well with the progression of closed chain activities, no reports of pain with the routine.      Timed:         Manual Therapy:    0     mins  11032;     Therapeutic Exercise:    8     mins  33901;     Neuromuscular [place]:    0    mins  97318;    Therapeutic Activity:     27     mins  16239;     Gait Trainin     mins  76119;     Ultrasound:     0     mins  72971;    Work Conditioning      __0_  mins  97330      Un-Timed:  Electrical Stimulation:    0     mins  43366 ( );        Timed Treatment:   35   mins   Total Treatment:     35   mins    Jeffrey Louis, PT  [place] License: [id number]

## 2022-02-09 ENCOUNTER — OFFICE VISIT (OUTPATIENT)
Dept: ORTHOPEDIC SURGERY | Facility: CLINIC | Age: 52
End: 2022-02-09

## 2022-02-09 ENCOUNTER — TREATMENT (OUTPATIENT)
Dept: PHYSICAL THERAPY | Facility: CLINIC | Age: 52
End: 2022-02-09

## 2022-02-09 VITALS — HEIGHT: 72 IN | WEIGHT: 286 LBS | BODY MASS INDEX: 38.74 KG/M2 | TEMPERATURE: 96.7 F

## 2022-02-09 DIAGNOSIS — T81.31XD POSTOPERATIVE WOUND DEHISCENCE, SUBSEQUENT ENCOUNTER: ICD-10-CM

## 2022-02-09 DIAGNOSIS — S93.422D TEAR OF DELTOID LIGAMENT OF LEFT ANKLE, SUBSEQUENT ENCOUNTER: ICD-10-CM

## 2022-02-09 DIAGNOSIS — S82.62XD CLOSED DISPLACED FRACTURE OF LATERAL MALLEOLUS OF LEFT FIBULA WITH ROUTINE HEALING: Primary | ICD-10-CM

## 2022-02-09 DIAGNOSIS — G57.92 NEURITIS OF LEFT ANKLE: ICD-10-CM

## 2022-02-09 DIAGNOSIS — S82.392D CLOSED FRACTURE OF POSTERIOR MALLEOLUS OF LEFT TIBIA WITH ROUTINE HEALING, SUBSEQUENT ENCOUNTER: ICD-10-CM

## 2022-02-09 DIAGNOSIS — R52 PAIN: Primary | ICD-10-CM

## 2022-02-09 DIAGNOSIS — S82.62XD CLOSED DISPLACED FRACTURE OF LATERAL MALLEOLUS OF LEFT FIBULA WITH ROUTINE HEALING, SUBSEQUENT ENCOUNTER: ICD-10-CM

## 2022-02-09 PROCEDURE — 97530 THERAPEUTIC ACTIVITIES: CPT | Performed by: PHYSICAL THERAPIST

## 2022-02-09 PROCEDURE — 99213 OFFICE O/P EST LOW 20 MIN: CPT | Performed by: ORTHOPAEDIC SURGERY

## 2022-02-09 PROCEDURE — 97112 NEUROMUSCULAR REEDUCATION: CPT | Performed by: PHYSICAL THERAPIST

## 2022-02-09 PROCEDURE — 73610 X-RAY EXAM OF ANKLE: CPT | Performed by: ORTHOPAEDIC SURGERY

## 2022-02-09 PROCEDURE — 97110 THERAPEUTIC EXERCISES: CPT | Performed by: PHYSICAL THERAPIST

## 2022-02-09 NOTE — PROGRESS NOTES
Physical Therapy Daily Progress Note      Patient: Vinnie Ventura   : 1970  Diagnosis/ICD-10 Code:  Closed displaced fracture of lateral malleolus of left fibula with routine healing [S82.62XD]  Referring practitioner: Clark Serna*  Date of Initial Visit: Type: THERAPY  Noted: 2021  Today's Date: 2022  Patient seen for 8 sessions         Vinnie Ventura reports: Intermittent bouts of shooting pain across the top of the L foot.       Subjective     Objective   See Exercise, Manual, and Modality Logs for complete treatment.       Assessment/Plan Pt tolerated session well with increase in balance activities on various surfaces to challenge ankle stability and exercises to improve proximal hip stability. Pt continues to present with increased L lower extremity external rotation greater than the R during gait and and requires verbal cueing along with the addition of psoas stretching and hip abductor strengthening to address deficit. Pt would continue to benefit from skilled PT services to improve ankle and hip stability to facilitate good gait mechanics and reduce pain during prolonged activity. Continue to progress balance and strength activities as tolerated.     Progress per Plan of Care    Visit Diagnoses:    ICD-10-CM ICD-9-CM   1. Closed displaced fracture of lateral malleolus of left fibula with routine healing  S82.62XD V54.19   2. Tear of deltoid ligament of left ankle, subsequent encounter  S93.422D V58.89     845.01              Manual Therapy:    0     mins  28854;  Therapeutic Exercise:    24     mins  30429;     Neuromuscular [place]:    8    mins  69503;    Therapeutic Activity:     8     mins  85766;     Gait Trainin     mins  87414;     Ultrasound:     0     mins  87942;    Electrical Stimulation:    0     mins  49234 ( );  Dry Needling     0     mins self-pay    Timed Treatment:   40   mins   Total Treatment:     40   mins    I was present in the PT Department  guiding the student by approving, concurring, and confirming the skilled judgement for all services rendered.     Jeffrey Louis, PT  Physical Therapist

## 2022-02-09 NOTE — PROGRESS NOTES
"Ankle Follow Up      Patient: Vinnie HUYNH [place]    YOB: 1970 52 y.o. male    Chief Complaints: Ankle \"feels pretty good\"    History of Present Illness:Patient follows up for operative treatment of his left ankle from 10/22/2021 with plating of his lateral malleolus and closed treatment of posterior malleolar fracture as well as anteromedial arthrotomy with removal of loose body with drilling of the medial talar chondral lesion and repair of deltoid with Arthrex fiber tack and syndesmotic stabilization using Arthrex tight rope.     He was  seen on 11/17/2021 and been nonweightbearing and had had an episode where he fell off his scooter again since her previous visit and reported the pain was improving and was being done on his medication with some occasional sharp \"nerve pain\" going up his leg.  His wound showed some areas of resting superficial dehiscence but no obvious deep extension or sign of infection.  Instructions were given and use of the boot and maintain nonweightbearing but gentle range of motion exercises were mild.  He was also started on gabapentin 300 mg nightly.     He was seen on 12/6/2021 and wound had continued to improve and had not had any drainage from it.  His pain was improving as well still with some occasional sharp burning pain but the Neurontin was helping with that at night.  A small piece of stitch was removed from his wound at that time.     He was instructed on continue Betadine dressing changes and given the small area of induration was started on some clindamycin 150 mg p.o. every 8 hours for 10 days and allowed to progress gradually up to 50% weightbearing with his boot and walker.     Was seen on 12/20/2021 and had misunderstood my instructions and quit using the boot after previous visit and used the walker up until 2 to 3 days prior to that visit when he transitioned to a cane.  He stated that his pain had improved to some degree still got some occasional pain " shooting up the side of his leg and intermittent feeling of thickness in his forefoot. The lateral wound had improved (he finished antibiotics but had some diarrhea and this slowly resolved after finishing antibiotics). His lateral wound   had a small area pf crusting but not any pain or drainage from it.  He reported an occasional discomfort of the medial aspect of the ankle at the area of his suture button.     At that time his wound showed no sign of infection and he was instructed on progressing with weightbearing and using an air stirrup brace that he been using for sleeping and get started in physical therapy.     He was seen on 1/12/2022 and wound issue had  resolved and still got an occasional feeling of burning in the dorsal lateral aspect of his foot but only after therapy  and that had improved quite a bit.  He remained on gabapentin 300 mg nightly and also was taking some after he had donetherapy which helps with the neuritic symptoms which again were improved and were only associated with therapy.    Instructions were given for weaning out of his air stirrup brace and use his cane as needed and continue with therapy and gabapentin.    He is seen back today stating that he is doing pretty well he has continued to do therapy and has been out of his boot and brace and off his cane for at least a week now still gets some intermittent shooting pain in the dorsolateral ankle and foot with therapy but not otherwise.  He has been continuing to work sitting at monitors.    HPI    ROS: ankle pain  Past Medical History:   Diagnosis Date   • Asymptomatic PVCs    • Closed fracture of left ankle 10/13/2021   • CTS (carpal tunnel syndrome) 1996   • Encounter for special screening examination for neoplasm of prostate     > 5 yrs ago   • Eye exam normal     > 5 yrs ago   • GERD (gastroesophageal reflux disease)    • Hypertension    • Left ankle pain    • Rotator cuff syndrome 2012    LEFT   • Sleep apnea     CPAP NOT  "USING DUE TO HAVING ISSUE WITH FITTING   • SVT (supraventricular tachycardia) ([facility])        Physical Exam:   Vitals:    02/09/22 1017   Temp: 96.7 °F (35.9 °C)   Weight: 130 kg (286 lb)   Height: 182.9 cm (72\")   PainSc:   2     Well developed with normal mood.  Left ankle incisions are now healed nicely and I was unable to elicit any focal discomfort with range of motion or external rotation testing.  He was grossly sensate light touch over the dorsal lateral foot with no mottling or hyperesthesia..  He was nontender over the anteromedial ankle      Radiology: Three views of the left ankle ordered to evaluate these show the fibula fracture appears to be healed in good alignment and no widening of the syndesmosis or medial clear space remains obvious osteochondral injury over the medial talus without change compared to previous x-rays.      Assessment/Plan:   1 status post ORIF left ankle as outlined above with chondral injury medial talar dome treated with drilling.    Overall he seems to be doing relatively well and improving and pleased with his outcome.  He still on the gabapentin is to get some shooting pain with therapy but this does seem to be settling down and nothing really different to do for it.    He may slowly advance activities and continue with therapy and anything worsens let me know otherwise I will see him back in 6 weeks with x-rays of his left ankle.  "

## 2022-02-15 RX ORDER — OMEPRAZOLE 20 MG/1
20 CAPSULE, DELAYED RELEASE ORAL DAILY
Qty: 90 CAPSULE | Refills: 1 | Status: CANCELLED | OUTPATIENT
Start: 2022-02-15

## 2022-02-15 RX ORDER — OMEPRAZOLE 20 MG/1
20 CAPSULE, DELAYED RELEASE ORAL DAILY
Qty: 90 CAPSULE | Refills: 1 | Status: SHIPPED | OUTPATIENT
Start: 2022-02-15 | End: 2022-07-28 | Stop reason: SDUPTHER

## 2022-02-15 NOTE — TELEPHONE ENCOUNTER
Rx Refill Note  Requested Prescriptions     Pending Prescriptions Disp Refills   • omeprazole (priLOSEC) 20 MG capsule 90 capsule 0     Sig: Take 1 capsule by mouth Daily.      Last office visit with prescribing clinician: 5/27/2021      Next office visit with prescribing clinician: Visit date not found            Xiomara Jackman MA  02/15/22, 11:28 EST

## 2022-02-16 ENCOUNTER — TREATMENT (OUTPATIENT)
Dept: PHYSICAL THERAPY | Facility: CLINIC | Age: 52
End: 2022-02-16

## 2022-02-16 DIAGNOSIS — S93.422D TEAR OF DELTOID LIGAMENT OF ANKLE, LEFT, SUBSEQUENT ENCOUNTER: ICD-10-CM

## 2022-02-16 DIAGNOSIS — S82.62XD CLOSED DISPLACED FRACTURE OF LATERAL MALLEOLUS OF LEFT FIBULA WITH ROUTINE HEALING, SUBSEQUENT ENCOUNTER: Primary | ICD-10-CM

## 2022-02-16 PROCEDURE — 97110 THERAPEUTIC EXERCISES: CPT | Performed by: PHYSICAL THERAPIST

## 2022-02-16 PROCEDURE — 97530 THERAPEUTIC ACTIVITIES: CPT | Performed by: PHYSICAL THERAPIST

## 2022-02-16 PROCEDURE — 97112 NEUROMUSCULAR REEDUCATION: CPT | Performed by: PHYSICAL THERAPIST

## 2022-02-16 NOTE — PROGRESS NOTES
Physical Therapy Daily Progress Note    Patient: Vinnie Ventura   : 1970  Diagnosis/ICD-10 Code:  Closed displaced fracture of lateral malleolus of left fibula with routine healing, subsequent encounter [S82.62XD]  Referring practitioner: Clark Serna*  Date of Initial Visit: Type: THERAPY  Noted: 2021  Today's Date: 2022  Patient seen for 9 sessions         Vinnie Ventura reports: Pain in top and lateral side of L foot on this date, unknown cause with no increase in activity level at work. Some difficulties when balance is challenged when ambulating with patients at work.         Subjective     Objective   See Exercise, Manual, and Modality Logs for complete treatment.       Assessment/Plan Pt tolerated session well with increase in balance activities on foam surface and reaching activities outside of base of support. When stepping forward with high and low reach pt experienced loss of balance, pt was instructed to reduce step distance and balance improved. Pt experienced mild increase in pain from 0/10 to 2/10 with lateral stepping on airex. Pt continues to demonstrate decreased hip strength indicated with knees caving in medially during squatting. Continue to progress functional training and balance activities as tolerated.     Progress per Plan of Care    Visit Diagnoses:    ICD-10-CM ICD-9-CM   1. Closed displaced fracture of lateral malleolus of left fibula with routine healing, subsequent encounter  S82.62XD V54.19   2. Tear of deltoid ligament of ankle, left, subsequent encounter  S93.422D V58.89     845.01              Manual Therapy:    0     mins  84912;  Therapeutic Exercise:    8   mins  64924;     Neuromuscular [place]:    8    mins  83105;    Therapeutic Activity:     23     mins  64681;     Gait Trainin     mins  30719;     Ultrasound:     0     mins  10742;    Electrical Stimulation:    0     mins  73501 ( );  Dry Needling     0     mins self-pay    Timed  Treatment:   39   mins   Total Treatment:     39   mins    I was present in the PT Department guiding the student by approving, concurring, and confirming the skilled judgement for all services rendered.     Jeffrey Louis, PT  Physical Therapist  Maria Teresa Horton, Student PT

## 2022-03-02 ENCOUNTER — TREATMENT (OUTPATIENT)
Dept: PHYSICAL THERAPY | Facility: CLINIC | Age: 52
End: 2022-03-02

## 2022-03-02 DIAGNOSIS — S82.62XD CLOSED DISPLACED FRACTURE OF LATERAL MALLEOLUS OF LEFT FIBULA WITH ROUTINE HEALING, SUBSEQUENT ENCOUNTER: Primary | ICD-10-CM

## 2022-03-02 PROCEDURE — 97530 THERAPEUTIC ACTIVITIES: CPT | Performed by: PHYSICAL THERAPIST

## 2022-03-02 PROCEDURE — 97110 THERAPEUTIC EXERCISES: CPT | Performed by: PHYSICAL THERAPIST

## 2022-03-09 ENCOUNTER — TREATMENT (OUTPATIENT)
Dept: PHYSICAL THERAPY | Facility: CLINIC | Age: 52
End: 2022-03-09

## 2022-03-09 DIAGNOSIS — S93.422D TEAR OF DELTOID LIGAMENT OF LEFT ANKLE, SUBSEQUENT ENCOUNTER: Primary | ICD-10-CM

## 2022-03-09 PROCEDURE — 97110 THERAPEUTIC EXERCISES: CPT | Performed by: PHYSICAL THERAPIST

## 2022-03-09 PROCEDURE — 97530 THERAPEUTIC ACTIVITIES: CPT | Performed by: PHYSICAL THERAPIST

## 2022-03-09 NOTE — PROGRESS NOTES
Physical Therapy Daily Progress Note      Patient: Vinnie Ventura   : 1970  Diagnosis/ICD-10 Code:  Tear of deltoid ligament of left ankle, subsequent encounter [S93.422D]  Referring practitioner: Clark Serna*  Date of Initial Visit: Type: THERAPY  Noted: 2021  Today's Date: 3/9/2022  Patient seen for 11 sessions         Vinnie Ventura reports: Ankle seems to be doing well with no pain at beginning of session.     Subjective     Objective   See Exercise, Manual, and Modality Logs for complete treatment.       Assessment/Plan Pt tolerated session well and is showing improved reactive balance with multi-surface training and improved lower extremity strength with improving squat mechanics. Pt continues to require minimal verbal cueing for keeping L foot in neutral during exercises. Continue to progress strengthening and balance training as tolerated.     Progress per Plan of Care    Visit Diagnoses:    ICD-10-CM ICD-9-CM   1. Tear of deltoid ligament of left ankle, subsequent encounter  S93.422D V58.89     845.01              Manual Therapy:    0     mins  25764;  Therapeutic Exercise:    10     mins  74428;     Neuromuscular [place]:    0    mins  47252;    Therapeutic Activity:     23     mins  80959;     Gait Trainin     mins  33502;     Ultrasound:     0     mins  29507;    Electrical Stimulation:    0     mins  65695 ( );  Dry Needling     0     mins self-pay    Timed Treatment:   33   mins   Total Treatment:     33   mins    Maria Teresa Horton, Student PT   I was present in the PT Department guiding the student by approving, concurring, and confirming the skilled judgement for all services rendered.     Jeffrey Louis, PT  Physical Therapist

## 2022-03-16 ENCOUNTER — TREATMENT (OUTPATIENT)
Dept: PHYSICAL THERAPY | Facility: CLINIC | Age: 52
End: 2022-03-16

## 2022-03-16 DIAGNOSIS — S82.62XD CLOSED DISPLACED FRACTURE OF LATERAL MALLEOLUS OF LEFT FIBULA WITH ROUTINE HEALING, SUBSEQUENT ENCOUNTER: Primary | ICD-10-CM

## 2022-03-16 PROCEDURE — 97110 THERAPEUTIC EXERCISES: CPT | Performed by: PHYSICAL THERAPIST

## 2022-03-16 PROCEDURE — 97530 THERAPEUTIC ACTIVITIES: CPT | Performed by: PHYSICAL THERAPIST

## 2022-03-16 NOTE — PROGRESS NOTES
Physical Therapy Daily Progress Note        Patient: Vinnie Ventura   : 1970  Diagnosis/ICD-10 Code:  Closed displaced fracture of lateral malleolus of left fibula with routine healing, subsequent encounter [S82.62XD]  Referring practitioner: Clark Serna*  Date of Initial Visit: Type: THERAPY  Noted: 2021  Today's Date: 3/16/2022  Patient seen for 12 sessions         Vinnie Ventura reports: Doing better overall with intermittent numbness and tingling in L ankle following prolonged activity and occasional feeling of instability and loss of balance when on uneven surfaces or changing positions.     Subjective     Objective   See Exercise, Manual, and Modality Logs for complete treatment.       Assessment/Plan Pt tolerated session well with increase in dynamic balance activities and uneven surface training. Pt is showing improved stability and control with all exercises but continues to require finger tip support at bar during uneven surface activities. Pt to see MD next week and will follow up with PT based on doctor recommendations.     Progress per Plan of Care    Visit Diagnoses:    ICD-10-CM ICD-9-CM   1. Closed displaced fracture of lateral malleolus of left fibula with routine healing, subsequent encounter  S82.62XD V54.19              Manual Therapy:    0     mins  00032;  Therapeutic Exercise:    10     mins  61305;     Neuromuscular [place]:    0    mins  93729;    Therapeutic Activity:     25     mins  04596;     Gait Trainin     mins  48066;     Ultrasound:     0     mins  56448;    Electrical Stimulation:    0     mins  73398 ( );  Dry Needling     0     mins self-pay    Timed Treatment:   35   mins   Total Treatment:     35   mins    I was present in the PT Department guiding the student by approving, concurring, and confirming the skilled judgement for all services rendered.     Maria Teresa Horton, Student PT   Jeffrey Louis, PT  Physical Therapist

## 2022-03-23 ENCOUNTER — OFFICE VISIT (OUTPATIENT)
Dept: ORTHOPEDIC SURGERY | Facility: CLINIC | Age: 52
End: 2022-03-23

## 2022-03-23 VITALS — HEIGHT: 72 IN | TEMPERATURE: 98 F | BODY MASS INDEX: 40.63 KG/M2 | WEIGHT: 300 LBS

## 2022-03-23 DIAGNOSIS — S82.392D CLOSED FRACTURE OF POSTERIOR MALLEOLUS OF LEFT TIBIA WITH ROUTINE HEALING, SUBSEQUENT ENCOUNTER: ICD-10-CM

## 2022-03-23 DIAGNOSIS — R52 PAIN: Primary | ICD-10-CM

## 2022-03-23 DIAGNOSIS — G57.92 NEURITIS OF LEFT ANKLE: ICD-10-CM

## 2022-03-23 DIAGNOSIS — S93.422D TEAR OF DELTOID LIGAMENT OF LEFT ANKLE, SUBSEQUENT ENCOUNTER: ICD-10-CM

## 2022-03-23 DIAGNOSIS — S82.62XD CLOSED DISPLACED FRACTURE OF LATERAL MALLEOLUS OF LEFT FIBULA WITH ROUTINE HEALING, SUBSEQUENT ENCOUNTER: ICD-10-CM

## 2022-03-23 PROCEDURE — 73610 X-RAY EXAM OF ANKLE: CPT | Performed by: ORTHOPAEDIC SURGERY

## 2022-03-23 PROCEDURE — 99213 OFFICE O/P EST LOW 20 MIN: CPT | Performed by: ORTHOPAEDIC SURGERY

## 2022-03-30 ENCOUNTER — DOCUMENTATION (OUTPATIENT)
Dept: PHYSICAL THERAPY | Facility: CLINIC | Age: 52
End: 2022-03-30

## 2022-04-07 NOTE — PROGRESS NOTES
"Ankle Follow Up      Patient: Vinnie HUYNH Paton    YOB: 1970 52 y.o. male    Chief Complaints: Ankle \"doing pretty good\"    History of Present Illness:Patient follows up for operative treatment of his left ankle from 10/22/2021 with plating of his lateral malleolus and closed treatment of posterior malleolar fracture as well as anteromedial arthrotomy with removal of loose body with drilling of the medial talar chondral lesion and repair of deltoid with Arthrex fiber tack and syndesmotic stabilization using Arthrex tight rope.     He was  seen on 11/17/2021 and been nonweightbearing and had had an episode where he fell off his scooter again since her previous visit and reported the pain was improving and was being done on his medication with some occasional sharp \"nerve pain\" going up his leg.  His wound showed some areas of resting superficial dehiscence but no obvious deep extension or sign of infection.  Instructions were given and use of the boot and maintain nonweightbearing but gentle range of motion exercises were mild.  He was also started on gabapentin 300 mg nightly.     He was seen on 12/6/2021 and wound had continued to improve and had not had any drainage from it.  His pain was improving as well still with some occasional sharp burning pain but the Neurontin was helping with that at night.  A small piece of stitch was removed from his wound at that time.     He was instructed on continue Betadine dressing changes and given the small area of induration was started on some clindamycin 150 mg p.o. every 8 hours for 10 days and allowed to progress gradually up to 50% weightbearing with his boot and walker.     Was seen on 12/20/2021 and had misunderstood my instructions and quit using the boot after previous visit and used the walker up until 2 to 3 days prior to that visit when he transitioned to a cane.  He stated that his pain had improved to some degree still got some occasional pain " shooting up the side of his leg and intermittent feeling of thickness in his forefoot. The lateral wound had improved (he finished antibiotics but had some diarrhea and this slowly resolved after finishing antibiotics). His lateral wound   had a small area pf crusting but not any pain or drainage from it.  He reported an occasional discomfort of the medial aspect of the ankle at the area of his suture button.     At that time his wound showed no sign of infection and he was instructed on progressing with weightbearing and using an air stirrup brace that he been using for sleeping and get started in physical therapy.     He was seen on 1/12/2022 and wound issue had  resolved and still got an occasional feeling of burning in the dorsal lateral aspect of his foot but only after therapy  and that had improved quite a bit.  He remained on gabapentin 300 mg nightly and also was taking some after he had donetherapy which helps with the neuritic symptoms which again were improved and were only associated with therapy.     Instructions were given for weaning out of his air stirrup brace and use his cane as needed and continue with therapy and gabapentin.     He was seen on 2/9/2022 stating that he was doing pretty well he had continued to do therapy and at been out of his boot and brace and off his cane for at least a week.  He still got some intermittent shooting pain in the dorsolateral ankle and foot with therapy but not otherwise.  He had been continuing to work sitting at [email or web address]    Overall he seems be doing well and pleased with his outcome and was still getting some shooting pain for which he was on gabapentin but it was settling down.  He was allowed to slowly advance activities and continue with therapy    He is seen back today stating that he only gets a slight occasional ache no more than 1 out of 10 at the ankle.  He is no longer getting any shooting feelings that he had and is down to taking gabapentin only  "about once a week.  He intermittently feels like his forefoot is \"fat\".  He has resumed gym activities doing elliptical and tolerating that well and feels like therapy is continuing to help.  HPI    ROS: ankle pain  Past Medical History:   Diagnosis Date   • Asymptomatic PVCs    • Closed fracture of left ankle 10/13/2021   • CTS (carpal tunnel syndrome) 1996   • Encounter for special screening examination for neoplasm of prostate     > 5 yrs ago   • Eye exam normal     > 5 yrs ago   • GERD (gastroesophageal reflux disease)    • Hypertension    • Left ankle pain    • Rotator cuff syndrome 2012    LEFT   • Sleep apnea     CPAP NOT USING DUE TO HAVING ISSUE WITH FITTING   • SVT (supraventricular tachycardia) ([facility])        Physical Exam:   Vitals:    03/23/22 0912   Temp: 98 °F (36.7 °C)   Weight: 136 kg (300 lb)   Height: 182.9 cm (72\")     Well developed with normal mood.  Right ankle shows minimal swelling no warmth erythema I was unable to elicit any focal tenderness to palpation of the distal fibula or medial ankle and no pain with external rotation testing or proximal fibular compression testing.  Good eversion strength and was grossly sensate to light touch throughout the right foot and ankle without sign of RSD.  There was no focal tenderness over the anteromedial ankle.      Radiology: 3 views of the left ankle ordered evaluate postoperative alignment reviewed and compared to previous x-rays.  Talus appears well-seated within the mortise and fibula fracture appears healed.  There is no change in alignment of the syndesmosis which appears to remain reduced.  There is still some evidence of the medial condyle lesion of the talus but overall dome appears intact.      Assessment/Plan:   1 status post ORIF left ankle as outlined above with chondral injury medial talar dome treated with drilling.    Overall seem to be doing very well and reviewed with him he may still get some intermittent discomfort due to the " chondral injury to his ankle but thankfully fracture appears healed and neuritic symptoms have essentially resolved.    He will continue weaning off of the gabapentin and continue with therapy to they have plateaued and continue with home exercise.    He may advance activities as tolerated and by mutual agreement I will see him back as needed but if anything worsens he will let me know.   medicine [facility]

## 2022-04-20 NOTE — TELEPHONE ENCOUNTER
Rx Refill Note  Requested Prescriptions     Pending Prescriptions Disp Refills   • lisinopril (PRINIVIL,ZESTRIL) 10 MG tablet 90 tablet 0     Sig: Take 1 tablet by mouth Daily.      Last office visit with prescribing clinician: 05/27/2021      Next office visit with prescribing clinician: Visit date not found            Luis Toscano Rep  04/20/22, 14:13 EDT

## 2022-04-21 RX ORDER — LISINOPRIL 10 MG/1
10 TABLET ORAL DAILY
Qty: 90 TABLET | Refills: 0 | Status: SHIPPED | OUTPATIENT
Start: 2022-04-21 | End: 2022-07-21 | Stop reason: SDUPTHER

## 2022-07-22 RX ORDER — LISINOPRIL 10 MG/1
10 TABLET ORAL DAILY
Qty: 90 TABLET | Refills: 0 | Status: SHIPPED | OUTPATIENT
Start: 2022-07-22 | End: 2022-10-20 | Stop reason: SDUPTHER

## 2022-07-22 NOTE — TELEPHONE ENCOUNTER
Rx Refill Note  Requested Prescriptions     Pending Prescriptions Disp Refills   • lisinopril (PRINIVIL,ZESTRIL) 10 MG tablet 90 tablet 0     Sig: Take 1 tablet by mouth Daily.      Last office visit with prescribing clinician: 5/27/2021      Next office visit with prescribing clinician: Visit date not found       {TIP  Please add Last Relevant Lab Date if appropriate: 10/19/22    MARIO Velarde  07/22/22, 14:09 EDT

## 2022-07-29 RX ORDER — OMEPRAZOLE 20 MG/1
20 CAPSULE, DELAYED RELEASE ORAL DAILY
Qty: 90 CAPSULE | Refills: 0 | Status: SHIPPED | OUTPATIENT
Start: 2022-07-29 | End: 2022-10-20 | Stop reason: SDUPTHER

## 2022-07-29 NOTE — TELEPHONE ENCOUNTER
Rx Refill Note  Requested Prescriptions     Pending Prescriptions Disp Refills   • omeprazole (priLOSEC) 20 MG capsule 90 capsule 1     Sig: Take 1 capsule by mouth Daily.      Last office visit with prescribing clinician: 5/27/2021      Next office visit with prescribing clinician: Visit date not found            Bernabe Frias  07/29/22, 08:58 EDT

## 2022-10-20 RX ORDER — LISINOPRIL 10 MG/1
10 TABLET ORAL DAILY
Qty: 90 TABLET | Refills: 0 | Status: CANCELLED | OUTPATIENT
Start: 2022-10-20

## 2022-10-20 RX ORDER — OMEPRAZOLE 20 MG/1
20 CAPSULE, DELAYED RELEASE ORAL DAILY
Qty: 90 CAPSULE | Refills: 0 | Status: CANCELLED | OUTPATIENT
Start: 2022-10-20

## 2022-10-21 RX ORDER — OMEPRAZOLE 20 MG/1
20 CAPSULE, DELAYED RELEASE ORAL DAILY
Qty: 90 CAPSULE | Refills: 0 | Status: SHIPPED | OUTPATIENT
Start: 2022-10-21 | End: 2022-12-27 | Stop reason: SDUPTHER

## 2022-10-21 RX ORDER — LISINOPRIL 10 MG/1
10 TABLET ORAL DAILY
Qty: 90 TABLET | Refills: 0 | Status: SHIPPED | OUTPATIENT
Start: 2022-10-21 | End: 2022-12-27 | Stop reason: SDUPTHER

## 2022-12-27 NOTE — TELEPHONE ENCOUNTER
Rx Refill Note  Requested Prescriptions     Pending Prescriptions Disp Refills   • lisinopril (PRINIVIL,ZESTRIL) 10 MG tablet 90 tablet 0     Sig: Take 1 tablet by mouth Daily.   • omeprazole (priLOSEC) 20 MG capsule 90 capsule 0     Sig: Take 1 capsule by mouth Daily.      Last office visit with prescribing clinician: Visit date not found   Last telemedicine visit with prescribing clinician: Visit date not found   Next office visit with prescribing clinician: Visit date not found       {TIP  Please add Last Relevant Lab Date if appropriate: 10/19/21                 Would you like a call back once the refill request has been completed: [] Yes [] No    If the office needs to give you a call back, can they leave a voicemail: [] Yes [] No    Yary Padilla MA  12/27/22, 16:51 EST

## 2022-12-28 RX ORDER — OMEPRAZOLE 20 MG/1
20 CAPSULE, DELAYED RELEASE ORAL DAILY
Qty: 90 CAPSULE | Refills: 0 | Status: SHIPPED | OUTPATIENT
Start: 2022-12-28 | End: 2023-03-31 | Stop reason: SDUPTHER

## 2022-12-28 RX ORDER — LISINOPRIL 10 MG/1
10 TABLET ORAL DAILY
Qty: 90 TABLET | Refills: 0 | Status: SHIPPED | OUTPATIENT
Start: 2022-12-28 | End: 2023-03-31 | Stop reason: SDUPTHER

## 2023-03-21 NOTE — PROGRESS NOTES
Physical Therapy Daily Progress Note    Patient: Vinnie Ventura   : 1970  Diagnosis/ICD-10 Code:  Closed displaced fracture of lateral malleolus of left fibula with routine healing, subsequent encounter [S82.62XD]  Referring practitioner: Clark Serna*  Date of Initial Visit: Type: THERAPY  Noted: 2021  Today's Date: 3/2/2022  Patient seen for 10 sessions         Vinnie Ventura reports: No current pain today but continues to have some nerve pain on medial top of foot.     Subjective     Objective   See Exercise, Manual, and Modality Logs for complete treatment.       Assessment/Plan Pt tolerated the session well showing improved gait mechanics with reduced external rotation of L leg with static standing and dynamic gait. Increased gait training was added on this date on uneven surfaces to improve balance and ankle reaction time. Pt subjectively improving overall with ability to walk ~2 hours with only mild increases in pain and increased pain following prolonged sitting at work. Continue to progress strengthening, balance, and gait training as tolerated.     Progress per Plan of Care    Visit Diagnoses:    ICD-10-CM ICD-9-CM   1. Closed displaced fracture of lateral malleolus of left fibula with routine healing, subsequent encounter  S82.62XD V54.19              Manual Therapy:    0     mins  51864;  Therapeutic Exercise:    18     mins  81691;     Neuromuscular [place]:    0    mins  50206;    Therapeutic Activity:     24     mins  54358;     Gait Trainin     mins  08213;     Ultrasound:     0     mins  86071;    Electrical Stimulation:    0     mins  95402 ( );  Dry Needling     0     mins self-pay    Timed Treatment:   42   mins   Total Treatment:     42   mins    Maria Teresa Horton, Student PT   I was present in the PT Department guiding the student by approving, concurring, and confirming the skilled judgement for all services rendered.     Jeffrey Louis, PT  Physical  Therapist   Patient/Caregiver requests family/friend to interpret.

## 2023-03-31 RX ORDER — OMEPRAZOLE 20 MG/1
20 CAPSULE, DELAYED RELEASE ORAL DAILY
Qty: 90 CAPSULE | Refills: 0 | Status: SHIPPED | OUTPATIENT
Start: 2023-03-31 | End: 2023-04-03 | Stop reason: SDUPTHER

## 2023-03-31 RX ORDER — LISINOPRIL 10 MG/1
10 TABLET ORAL DAILY
Qty: 90 TABLET | Refills: 0 | Status: SHIPPED | OUTPATIENT
Start: 2023-03-31

## 2023-03-31 RX ORDER — LISINOPRIL 10 MG/1
10 TABLET ORAL DAILY
Qty: 90 TABLET | Refills: 0 | OUTPATIENT
Start: 2023-03-31

## 2023-03-31 RX ORDER — OMEPRAZOLE 20 MG/1
20 CAPSULE, DELAYED RELEASE ORAL DAILY
Qty: 90 CAPSULE | Refills: 0 | OUTPATIENT
Start: 2023-03-31

## 2023-03-31 NOTE — TELEPHONE ENCOUNTER
Pt hasnt been seen in office since May 2021. Pt may need f/u visit.    Rx Refill Note  Requested Prescriptions     Pending Prescriptions Disp Refills   • lisinopril (PRINIVIL,ZESTRIL) 10 MG tablet 90 tablet 0     Sig: Take 1 tablet by mouth Daily.   • omeprazole (priLOSEC) 20 MG capsule 90 capsule 0     Sig: Take 1 capsule by mouth Daily.      Last office visit with prescribing clinician: 5/27/2021   Last telemedicine visit with prescribing clinician: Visit date not found   Next office visit with prescribing clinician: Visit date not found                         Would you like a call back once the refill request has been completed: [] Yes [] No    If the office needs to give you a call back, can they leave a voicemail: [] Yes [] No    Basilia Pickett MA  03/31/23, 10:12 EDT

## 2023-04-03 RX ORDER — LISINOPRIL 10 MG/1
10 TABLET ORAL DAILY
Qty: 90 TABLET | Refills: 0 | Status: SHIPPED | OUTPATIENT
Start: 2023-04-03

## 2023-04-03 RX ORDER — OMEPRAZOLE 20 MG/1
20 CAPSULE, DELAYED RELEASE ORAL DAILY
Qty: 90 CAPSULE | Refills: 0 | Status: SHIPPED | OUTPATIENT
Start: 2023-04-03

## 2023-04-03 NOTE — TELEPHONE ENCOUNTER
"Caller: Vinnie Ventura \"Derek\"    Relationship: Self    Best call back number: [phone]  Requested Prescriptions:   Requested Prescriptions     Pending Prescriptions Disp Refills   • lisinopril (PRINIVIL,ZESTRIL) 10 MG tablet 90 tablet 0     Sig: Take 1 tablet by mouth Daily.   • omeprazole (priLOSEC) 20 MG capsule 90 capsule 0     Sig: Take 1 capsule by mouth Daily.        Pharmacy where request should be sent:  [facility]    Last office visit with prescribing clinician: 5/27/2021   Last telemedicine visit with prescribing clinician: 5/8/2023   Next office visit with prescribing clinician: 5/8/2023     Additional details provided by patient: PATIENT HAS 2 DAY SUPPLY OF OMEPRAZOLE AND 4 DAY SUPPLY OF LISINOPRIL MEDICATION.  HE HAS APPOINTMENT SCHEDULED FOR 5/8/23 AND REQUESTS REFILL BE SENT TO HIS PHARMACY TO LAST UNTIL HIS UPCOMING APPOINTMENT.    Does the patient have less than a 3 day supply:  [x] Yes  [] No    Would you like a call back once the refill request has been completed: [] Yes [x] No    If the office needs to give you a call back, can they leave a voicemail: [] Yes [x] No    PLEASE ADVISE.    Luis Davis Rep   04/03/23 12:39 EDT           "

## 2023-04-03 NOTE — TELEPHONE ENCOUNTER
Rx Refill Note  Requested Prescriptions     Pending Prescriptions Disp Refills   • lisinopril (PRINIVIL,ZESTRIL) 10 MG tablet 90 tablet 0     Sig: Take 1 tablet by mouth Daily.   • omeprazole (priLOSEC) 20 MG capsule 90 capsule 0     Sig: Take 1 capsule by mouth Daily.      Last office visit with prescribing clinician: 5/27/2021   Last telemedicine visit with prescribing clinician: 5/8/2023   Next office visit with prescribing clinician: 5/8/2023                         Would you like a call back once the refill request has been completed: [] Yes [] No    If the office needs to give you a call back, can they leave a voicemail: [] Yes [] No    Luis Toscano Rep  04/03/23, 13:12 EDT

## 2023-05-08 ENCOUNTER — OFFICE VISIT (OUTPATIENT)
Dept: FAMILY MEDICINE CLINIC | Facility: CLINIC | Age: 53
End: 2023-05-08
Payer: COMMERCIAL

## 2023-05-08 VITALS
OXYGEN SATURATION: 95 % | DIASTOLIC BLOOD PRESSURE: 85 MMHG | BODY MASS INDEX: 42.66 KG/M2 | TEMPERATURE: 96.8 F | HEIGHT: 72 IN | SYSTOLIC BLOOD PRESSURE: 144 MMHG | HEART RATE: 91 BPM | WEIGHT: 315 LBS

## 2023-05-08 DIAGNOSIS — Z12.11 ENCOUNTER FOR SCREENING COLONOSCOPY: ICD-10-CM

## 2023-05-08 DIAGNOSIS — Z00.00 HEALTH CARE MAINTENANCE: Primary | ICD-10-CM

## 2023-05-08 DIAGNOSIS — I10 ESSENTIAL HYPERTENSION: ICD-10-CM

## 2023-05-08 DIAGNOSIS — G47.33 OSA (OBSTRUCTIVE SLEEP APNEA): ICD-10-CM

## 2023-05-08 DIAGNOSIS — Z12.11 SCREEN FOR COLON CANCER: ICD-10-CM

## 2023-05-08 DIAGNOSIS — Z12.5 SCREENING PSA (PROSTATE SPECIFIC ANTIGEN): ICD-10-CM

## 2023-05-08 PROCEDURE — 99396 PREV VISIT EST AGE 40-64: CPT | Performed by: FAMILY MEDICINE

## 2023-05-08 NOTE — PROGRESS NOTES
"Chief Complaint  Hypertension    Subjective        Vinnie Ventura presents to [facility]  History of Present Illness     Here for annual healthcare maintenance visit.  Not seen him in a couple years.  He continues on lisinopril 10 mg a day.  Blood pressure runs normal at work.  No adverse effects.  BMI greater than 40.  He is interested in losing weight.  Some mild depression symptoms.  Quit smoking about 12 years ago.  After a 14-pack-year history.  He is due for colon cancer screening.  He states he is not concerned about STIs.    Objective   Vital Signs:  /85   Pulse 91   Temp 96.8 °F (36 °C) (Temporal)   Ht 182.9 cm (72\")   Wt (!) 144 kg (317 lb 1.6 oz)   SpO2 95%   BMI 43.01 kg/m²   Estimated body mass index is 43.01 kg/m² as calculated from the following:    Height as of this encounter: 182.9 cm (72\").    Weight as of this encounter: 144 kg (317 lb 1.6 oz).             Physical Exam  Constitutional:       Appearance: Normal appearance. He is obese.   HENT:      Head: Atraumatic.      Mouth/Throat:      Pharynx: Oropharynx is clear. No oropharyngeal exudate or posterior oropharyngeal erythema.   Eyes:      Conjunctiva/sclera: Conjunctivae normal.   Neck:      Thyroid: No thyroid mass, thyromegaly or thyroid tenderness.   Cardiovascular:      Rate and Rhythm: Normal rate and regular rhythm.      Pulses: Normal pulses.      Heart sounds: Normal heart sounds.   Pulmonary:      Effort: Pulmonary effort is normal.      Breath sounds: Normal breath sounds.   Abdominal:      General: Abdomen is flat. There is no distension.      Palpations: Abdomen is soft. There is no mass.      Tenderness: There is no abdominal tenderness.      Hernia: No hernia is present.   Musculoskeletal:         General: Normal range of motion.      Cervical back: Neck supple. No muscular tenderness.   Lymphadenopathy:      Cervical: No cervical adenopathy.   Skin:     General: Skin is warm and dry.    "   Findings: No rash.   Neurological:      General: No focal deficit present.      Mental Status: He is alert and oriented to person, place, and time.   Psychiatric:         Mood and Affect: Mood normal.        Result Review :                   Assessment and Plan   Diagnoses and all orders for this visit:    1. Health care maintenance (Primary)  -     CBC & Differential  -     Comprehensive Metabolic Panel  -     Lipid Panel  -     Hepatitis C Antibody    2. Encounter for screening colonoscopy  -     Cancel: Ambulatory Referral to Gastroenterology  -     Ambulatory Referral For Screening Colonoscopy    3. Screen for colon cancer  -     Ambulatory Referral For Screening Colonoscopy    4. Essential hypertension  -     CBC & Differential  -     Comprehensive Metabolic Panel  -     Lipid Panel  -     Hepatitis C Antibody    5. Screening PSA (prostate specific antigen)  -     PSA Screen    6. NAV (obstructive sleep apnea)  -     Ambulatory Referral to Sleep Medicine      Annual healthcare maintenance visit.    Immunizations.  Due for the up-to-date COVID booster.  I recommend retail pharmacy.    Colon cancer screening.  Discussed various modalities.  Colonoscopy recommended.  Referral placed.    Hypertension.  Continue lisinopril.  Continue to monitor blood pressure at home.    Prostate cancer screening.  Ordering PSA.    Obesity.  BMI greater than 40.  Class III obesity.  He is a good candidate for semaglutide/Wegovy.  Discussed mechanism of action.  Discussed efficacy.  Discussed side effects.  Discussed risks including the potential  risk of medullary thyroid carcinoma in human beings, has been seen in rats.  FDA states relevance of this in human beings is unknown.  He states his sister had thyroid car insert, not sure what type.  He is going to find out.  If he thinks his insurance will pay for it and is interested in medication, he will return to see me in the next couple of months for evaluation.  Otherwise I will  see him in 6 months for diabetes recheck.    Other preventative health practices discussed.    History of smoking.  Does not qualify for lung cancer screening based on history of less than 20 pack years.    Sleep apnea.  He is looking for a new sleep apnea doctor.  Referral placed.         Follow Up   No follow-ups on file.  Patient was given instructions and counseling regarding his condition or for health maintenance advice. Please see specific information pulled into the AVS if appropriate.

## 2023-05-09 DIAGNOSIS — R73.01 IMPAIRED FASTING GLUCOSE: Primary | ICD-10-CM

## 2023-05-09 LAB
ALBUMIN SERPL-MCNC: 4.4 G/DL (ref 3.5–5.2)
ALBUMIN/GLOB SERPL: 1.5 G/DL
ALP SERPL-CCNC: 75 U/L (ref 39–117)
ALT SERPL-CCNC: 98 U/L (ref 1–41)
AST SERPL-CCNC: 70 U/L (ref 1–40)
BASOPHILS # BLD AUTO: 0.04 10*3/MM3 (ref 0–0.2)
BASOPHILS NFR BLD AUTO: 0.7 % (ref 0–1.5)
BILIRUB SERPL-MCNC: 0.8 MG/DL (ref 0–1.2)
BUN SERPL-MCNC: 13 MG/DL (ref 6–20)
BUN/CREAT SERPL: 14.3 (ref 7–25)
CALCIUM SERPL-MCNC: 9.7 MG/DL (ref 8.6–10.5)
CHLORIDE SERPL-SCNC: 99 MMOL/L (ref 98–107)
CHOLEST SERPL-MCNC: 216 MG/DL (ref 0–200)
CO2 SERPL-SCNC: 26.3 MMOL/L (ref 22–29)
CREAT SERPL-MCNC: 0.91 MG/DL (ref 0.76–1.27)
EGFRCR SERPLBLD CKD-EPI 2021: 101.4 ML/MIN/1.73
EOSINOPHIL # BLD AUTO: 0.11 10*3/MM3 (ref 0–0.4)
EOSINOPHIL NFR BLD AUTO: 1.8 % (ref 0.3–6.2)
ERYTHROCYTE [DISTWIDTH] IN BLOOD BY AUTOMATED COUNT: 13.6 % (ref 12.3–15.4)
GLOBULIN SER CALC-MCNC: 2.9 GM/DL
GLUCOSE SERPL-MCNC: 127 MG/DL (ref 65–99)
HCT VFR BLD AUTO: 49.3 % (ref 37.5–51)
HCV IGG SERPL QL IA: NON REACTIVE
HDLC SERPL-MCNC: 30 MG/DL (ref 40–60)
HGB BLD-MCNC: 16.5 G/DL (ref 13–17.7)
IMM GRANULOCYTES # BLD AUTO: 0.02 10*3/MM3 (ref 0–0.05)
IMM GRANULOCYTES NFR BLD AUTO: 0.3 % (ref 0–0.5)
LDLC SERPL CALC-MCNC: 137 MG/DL (ref 0–100)
LYMPHOCYTES # BLD AUTO: 1.83 10*3/MM3 (ref 0.7–3.1)
LYMPHOCYTES NFR BLD AUTO: 30.5 % (ref 19.6–45.3)
MCH RBC QN AUTO: 29 PG (ref 26.6–33)
MCHC RBC AUTO-ENTMCNC: 33.5 G/DL (ref 31.5–35.7)
MCV RBC AUTO: 86.8 FL (ref 79–97)
MONOCYTES # BLD AUTO: 0.76 10*3/MM3 (ref 0.1–0.9)
MONOCYTES NFR BLD AUTO: 12.7 % (ref 5–12)
NEUTROPHILS # BLD AUTO: 3.24 10*3/MM3 (ref 1.7–7)
NEUTROPHILS NFR BLD AUTO: 54 % (ref 42.7–76)
NRBC BLD AUTO-RTO: 0 /100 WBC (ref 0–0.2)
PLATELET # BLD AUTO: 206 10*3/MM3 (ref 140–450)
POTASSIUM SERPL-SCNC: 4.4 MMOL/L (ref 3.5–5.2)
PROT SERPL-MCNC: 7.3 G/DL (ref 6–8.5)
PSA SERPL-MCNC: 0.57 NG/ML (ref 0–4)
RBC # BLD AUTO: 5.68 10*6/MM3 (ref 4.14–5.8)
SODIUM SERPL-SCNC: 138 MMOL/L (ref 136–145)
TRIGL SERPL-MCNC: 269 MG/DL (ref 0–150)
VLDLC SERPL CALC-MCNC: 49 MG/DL (ref 5–40)
WBC # BLD AUTO: 6 10*3/MM3 (ref 3.4–10.8)

## 2023-05-30 ENCOUNTER — TELEPHONE (OUTPATIENT)
Dept: GASTROENTEROLOGY | Facility: CLINIC | Age: 53
End: 2023-05-30

## 2023-05-30 NOTE — TELEPHONE ENCOUNTER
"  Caller: Vinnie Ventura \"Derek\"    Relationship to patient: Self    Best call back number: [phone]    Patient is needing: PATIENT CALLED IN, RECEIVED A MESSAGE TO CALL US TO SCHEDULE PROCEDURE, WAS UNABLE TO WARM TRANSFER. PLEASE CONTACT PATIENT TO SCHEDULE. YOU CAN LEAVE A MESSAGE IF YOU NEED TO.           "

## 2023-05-31 ENCOUNTER — PREP FOR SURGERY (OUTPATIENT)
Dept: SURGERY | Facility: SURGERY CENTER | Age: 53
End: 2023-05-31

## 2023-05-31 DIAGNOSIS — Z12.11 ENCOUNTER FOR SCREENING FOR MALIGNANT NEOPLASM OF COLON: Primary | ICD-10-CM

## 2023-05-31 RX ORDER — SODIUM CHLORIDE, SODIUM LACTATE, POTASSIUM CHLORIDE, CALCIUM CHLORIDE 600; 310; 30; 20 MG/100ML; MG/100ML; MG/100ML; MG/100ML
30 INJECTION, SOLUTION INTRAVENOUS CONTINUOUS PRN
OUTPATIENT
Start: 2023-05-31

## 2023-06-05 ENCOUNTER — OFFICE VISIT (OUTPATIENT)
Dept: SLEEP MEDICINE | Facility: HOSPITAL | Age: 53
End: 2023-06-05
Payer: COMMERCIAL

## 2023-06-05 VITALS
HEART RATE: 76 BPM | OXYGEN SATURATION: 93 % | SYSTOLIC BLOOD PRESSURE: 136 MMHG | WEIGHT: 310.4 LBS | DIASTOLIC BLOOD PRESSURE: 76 MMHG | HEIGHT: 72 IN | BODY MASS INDEX: 42.04 KG/M2

## 2023-06-05 DIAGNOSIS — G47.34 NOCTURNAL HYPOXIA: ICD-10-CM

## 2023-06-05 DIAGNOSIS — E66.01 MORBID OBESITY WITH BODY MASS INDEX OF 40.0-49.9: ICD-10-CM

## 2023-06-05 DIAGNOSIS — G47.30 SEVERE SLEEP APNEA: Primary | ICD-10-CM

## 2023-06-05 DIAGNOSIS — Z78.9 INTOLERANCE OF CONTINUOUS POSITIVE AIRWAY PRESSURE (CPAP) VENTILATION: ICD-10-CM

## 2023-06-05 PROCEDURE — G0463 HOSPITAL OUTPT CLINIC VISIT: HCPCS

## 2023-06-05 RX ORDER — ZOLPIDEM TARTRATE 5 MG/1
5 TABLET ORAL NIGHTLY PRN
Qty: 1 TABLET | Refills: 0 | Status: SHIPPED | OUTPATIENT
Start: 2023-06-05

## 2023-06-05 NOTE — PROGRESS NOTES
[facility]  Telephone: [phone] / Fax: [phone] [place]  Telephone: [phone] / Fax: [phone] Charity Taylor    Referring Physician: Daniele Galvan  PCP: Cole Sanabria MD    Reason for consult:  sleep apnea    Vinnie Ventura is a 53 y.o.male  was seen in the Sleep Disorders Center today for evaluation of sleep apnea. He has severe underlying NAV with AHI 46/hr and severe sleep related hypoxia. Vinnie is a former pt of [facility] pt who is switching providers.  He has been having difficulty with CPAP for years. Advised to see DME for mask fit in 2020. However, still struggled. Nasal passages collapse with nasal mask. Unable to use FFM due to suffocating. Did ok with nasal pillow mask with a chin strap. 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 Bedtime schedule is am-4pm. ESS is 7.    SH- no tobacco, no alcohol, 1 caffeine per day.    ROS- negative.    Vinnie Ventura  has a past medical history of Allergic (1990), Asymptomatic PVCs, Closed fracture of left ankle (10/13/2021), CTS (carpal tunnel syndrome) (1996), Encounter for special screening examination for neoplasm of prostate, Eye exam normal, GERD (gastroesophageal reflux disease), Hypertension, Left ankle pain, Rotator cuff syndrome (2012), Sleep apnea, and SVT (supraventricular tachycardia).    Current Medications:    Current Outpatient Medications:     calcium-vitamin D (OSCAL-500) 500-200 MG-UNIT per tablet, Take 1 tablet by mouth Daily. PT HOLDING FOR SURGERY, Disp: , Rfl:     lisinopril (PRINIVIL,ZESTRIL) 10 MG tablet, Take 1 tablet by mouth Daily., Disp: 90 tablet, Rfl: 0    Lycopene 10 MG capsule, Take 1 tablet by mouth Daily. PT HOLDING FOR SURGERY, Disp: , Rfl:     Misc Natural Products (LUTEIN 20 PO), Take 1 tablet by mouth Daily. PT HOLDING FOR SURGERY, Disp: , Rfl:     Multiple Vitamins-Minerals (MULTIVITAMIN WITH MINERALS) tablet, Take 1 tablet by mouth Daily. PT HOLDING FOR SURGERY, Disp: , Rfl:     Omega-3  "Fatty Acids (FISH OIL) 1000 MG capsule, Take 1 capsule by mouth Daily With Breakfast. PT HOLDING FOR SURGERY, Disp: , Rfl:     omeprazole (priLOSEC) 20 MG capsule, Take 1 capsule by mouth Daily., Disp: 90 capsule, Rfl: 0    Saw Palmetto 450 MG capsule, Take 1 tablet by mouth Daily. PT HOLDING FOR SURGERY, Disp: , Rfl:     I have reviewed Past Medical History, Past Surgical History, Medication List, Social History and Family History as entered in Sleep Questionnaire and EPIC.    ESS  7   Vital Signs /76   Pulse 76   Ht 182.9 cm (72\")   Wt (!) 141 kg (310 lb 6.4 oz)   SpO2 93%   BMI 42.10 kg/m²  Body mass index is 42.1 kg/m².    General Alert and oriented. No acute distress noted   Pharynx/Throat Class  IV Mallampati airway, large tongue, no evidence of redundant lateral pharyngeal tissue. No oral lesions. No thrush. Moist mucous membranes.   Head Normocephalic. Symmetrical. Atraumatic.    Nose No septal deviation. No drainage   Chest Wall Normal shape. Symmetric expansion with respiration. No tenderness.   Neck Trachea midline, no thyromegaly or adenopathy    Lungs Clear to auscultation bilaterally. No wheezes. No rhonchi. No rales. Respirations regular, even and unlabored.   Heart Regular rhythm and normal rate. Normal S1 and S2. No murmur   Abdomen Soft, non-tender and non-distended. Normal bowel sounds. No masses.   Extremities Moves all extremities well. No edema   Psychiatric Normal mood and affect.       Diagnostic study    Study 2020  Findings:  TRT (total recording time)= 426 min; MT (monitoring time)= 423 min  Patient experienced 140 obstructive apnea, 0 central apnea and 184 hypopneas resulting in total AMARIS: 46.4 events/h. Supine time was 19 minutes resulting in Supine AMARIS: 31 events/hour. ADAN= 50 events/h; Trav SpO2= 66 % and hypoxic burden: 59.5 min.       Impression:  1. Severe sleep apnea    2. Intolerance of continuous positive airway pressure (CPAP) ventilation    3. Morbid obesity with " body mass index of 40.0-49.9    4. Nocturnal hypoxia          Plan:  CPAP titration with Ambien 5mg was scheduled. I reviewed 2020 sleep study report with patient in detail. He has severe NAV with severe hypoxic burden and is therefore at higher risk for stroke, CHF and adverse cardiovascular outcomes in general. I explained to him how the CPAP titration is conducted and what is the difference between CPAP and BIPAP. I asked him to bring his nasal pillow mask to the sleep center to ensure the size is right and to make sure it fits well. He also needs a chin strap to control leaks. We discussed CPAP/BIPAP alternatives such as OMAD and inspire device.  He is not interested in inspire therapy. He will f/u with Dr Trejo after the study. He has a recalled [employer] machine at home he got in 2020. He thinks that he already registered it with [employer] in the past, but has not yet received a replacement. If he does better on BIPAP during titration, we will order him a ResMed BIPAP through DME after the study.        I appreciate the opportunity to participate in this patient's care.      CHRISTINA Ramos  [facility]  Phone: [phone]      Part of this note may be an electronic transcription/translation of spoken language to printed text using the Dragon Dictation System. Some errors may exist even though the document was edited.

## 2023-06-23 PROBLEM — Z12.11 ENCOUNTER FOR SCREENING FOR MALIGNANT NEOPLASM OF COLON: Status: ACTIVE | Noted: 2023-06-23

## 2023-08-15 ENCOUNTER — HOSPITAL ENCOUNTER (OUTPATIENT)
Dept: SLEEP MEDICINE | Facility: HOSPITAL | Age: 53
Discharge: HOME | End: 2023-08-15
Admitting: NURSE PRACTITIONER
Payer: COMMERCIAL

## 2023-08-15 DIAGNOSIS — G47.30 SEVERE SLEEP APNEA: ICD-10-CM

## 2023-08-15 DIAGNOSIS — E66.01 MORBID OBESITY WITH BODY MASS INDEX OF 40.0-49.9: ICD-10-CM

## 2023-08-15 DIAGNOSIS — Z78.9 INTOLERANCE OF CONTINUOUS POSITIVE AIRWAY PRESSURE (CPAP) VENTILATION: ICD-10-CM

## 2023-08-15 PROCEDURE — 95811 POLYSOM 6/>YRS CPAP 4/> PARM: CPT

## 2023-08-16 DIAGNOSIS — R73.01 IMPAIRED FASTING GLUCOSE: ICD-10-CM

## 2023-08-17 LAB
ALBUMIN SERPL-MCNC: 4.9 G/DL (ref 3.5–5.2)
ALBUMIN/GLOB SERPL: 2 G/DL
ALP SERPL-CCNC: 67 U/L (ref 39–117)
ALT SERPL-CCNC: 57 U/L (ref 1–41)
AST SERPL-CCNC: 42 U/L (ref 1–40)
BILIRUB SERPL-MCNC: 0.8 MG/DL (ref 0–1.2)
BUN SERPL-MCNC: 15 MG/DL (ref 6–20)
BUN/CREAT SERPL: 14.3 (ref 7–25)
CALCIUM SERPL-MCNC: 9.9 MG/DL (ref 8.6–10.5)
CHLORIDE SERPL-SCNC: 100 MMOL/L (ref 98–107)
CHOLEST SERPL-MCNC: 210 MG/DL (ref 0–200)
CO2 SERPL-SCNC: 29 MMOL/L (ref 22–29)
CREAT SERPL-MCNC: 1.05 MG/DL (ref 0.76–1.27)
EGFRCR SERPLBLD CKD-EPI 2021: 85.4 ML/MIN/1.73
GLOBULIN SER CALC-MCNC: 2.4 GM/DL
GLUCOSE SERPL-MCNC: 86 MG/DL (ref 65–99)
HBA1C MFR BLD: 5.6 % (ref 4.8–5.6)
HDLC SERPL-MCNC: 33 MG/DL (ref 40–60)
LDLC SERPL CALC-MCNC: 145 MG/DL (ref 0–100)
POTASSIUM SERPL-SCNC: 4.7 MMOL/L (ref 3.5–5.2)
PROT SERPL-MCNC: 7.3 G/DL (ref 6–8.5)
SODIUM SERPL-SCNC: 140 MMOL/L (ref 136–145)
TRIGL SERPL-MCNC: 174 MG/DL (ref 0–150)
VLDLC SERPL CALC-MCNC: 32 MG/DL (ref 5–40)

## 2023-08-21 ENCOUNTER — OFFICE VISIT (OUTPATIENT)
Dept: FAMILY MEDICINE CLINIC | Facility: CLINIC | Age: 53
End: 2023-08-21
Payer: COMMERCIAL

## 2023-08-21 VITALS
OXYGEN SATURATION: 96 % | WEIGHT: 296 LBS | HEART RATE: 98 BPM | SYSTOLIC BLOOD PRESSURE: 134 MMHG | TEMPERATURE: 97.1 F | DIASTOLIC BLOOD PRESSURE: 80 MMHG | BODY MASS INDEX: 40.09 KG/M2 | HEIGHT: 72 IN

## 2023-08-21 DIAGNOSIS — I10 ESSENTIAL HYPERTENSION: Primary | ICD-10-CM

## 2023-08-21 PROCEDURE — 99213 OFFICE O/P EST LOW 20 MIN: CPT | Performed by: FAMILY MEDICINE

## 2023-08-21 NOTE — PROGRESS NOTES
"Answers submitted by the patient for this visit:  Primary Reason for Visit (Submitted on 8/15/2023)  What is the primary reason for your visit?: Other  Other (Submitted on 8/15/2023)  Please describe your symptoms.: Follow up  Have you had these symptoms before?: No  How long have you been having these symptoms?: Greater than 2 weeks  Chief Complaint  Hypertension    Subjective        Vinnie Ventura presents to [facility]  History of Present Illness    Follow-up hypertension and obesity.  Patient is on a great job with diet and exercise.  He is lost over 20 pounds.  He is feeling better.  He is not interested in a GLP-1 agonist.  His cholesterol is marginally improved with a higher HDL.  His fasting glucose has normalized.  His elevated liver enzymes have improved.  Negative hepatitis C testing.    Objective   Vital Signs:  /80   Pulse 98   Temp 97.1 øF (36.2 øC) (Temporal)   Ht 182.9 cm (72\")   Wt 134 kg (296 lb)   SpO2 96%   BMI 40.14 kg/mý   Estimated body mass index is 40.14 kg/mý as calculated from the following:    Height as of this encounter: 182.9 cm (72\").    Weight as of this encounter: 134 kg (296 lb).             Physical Exam  Vitals and nursing note reviewed.   Constitutional:       General: He is not in acute distress.     Appearance: He is well-developed. He is obese.   Cardiovascular:      Rate and Rhythm: Normal rate and regular rhythm.      Heart sounds: Normal heart sounds.   Pulmonary:      Effort: Pulmonary effort is normal.      Breath sounds: Normal breath sounds.   Skin:     General: Skin is warm and dry.   Psychiatric:         Mood and Affect: Mood normal.      Result Review :  The following data was reviewed by: Cole Sanabria MD on 08/21/2023:  Common labs          5/8/2023    13:34 8/16/2023    11:07   Common Labs   Glucose 127  86    BUN 13  15    Creatinine 0.91  1.05    Sodium 138  140    Potassium 4.4  4.7    Chloride 99  100    Calcium " 9.7  9.9    Total Protein 7.3  7.3    Albumin 4.4  4.9    Total Bilirubin 0.8  0.8    Alkaline Phosphatase 75  67    AST (SGOT) 70  42    ALT (SGPT) 98  57    WBC 6.00     Hemoglobin 16.5     Hematocrit 49.3     Platelets 206     Total Cholesterol 216  210    Triglycerides 269  174    HDL Cholesterol 30  33    LDL Cholesterol  137  145    Hemoglobin A1C  5.60    PSA 0.567                    Assessment and Plan   Diagnoses and all orders for this visit:    1. Essential hypertension (Primary)  -     Comprehensive Metabolic Panel; Future  -     Lipid Panel; Future      Hypertension.  Class III obesity, likely soon-to-be class II obesity.  Liver enzymes elevated, likely fatty liver, and improving.  The lipid panel is improved with a lower triglyceride level, and higher HDL.  He is not interested in a GLP-1 agonist.  This is reasonable.  I will see him back in 6 months with lab work prior.  He will continue the great work.         Follow Up   No follow-ups on file.  Patient was given instructions and counseling regarding his condition or for health maintenance advice. Please see specific information pulled into the AVS if appropriate.

## 2023-08-24 ENCOUNTER — OFFICE VISIT (OUTPATIENT)
Dept: FAMILY MEDICINE CLINIC | Facility: CLINIC | Age: 53
End: 2023-08-24
Payer: COMMERCIAL

## 2023-08-24 ENCOUNTER — LAB (OUTPATIENT)
Dept: LAB | Facility: HOSPITAL | Age: 53
End: 2023-08-24
Payer: COMMERCIAL

## 2023-08-24 VITALS
OXYGEN SATURATION: 95 % | BODY MASS INDEX: 39.78 KG/M2 | TEMPERATURE: 97.1 F | HEART RATE: 110 BPM | DIASTOLIC BLOOD PRESSURE: 60 MMHG | HEIGHT: 72 IN | RESPIRATION RATE: 18 BRPM | WEIGHT: 293.7 LBS | SYSTOLIC BLOOD PRESSURE: 110 MMHG

## 2023-08-24 DIAGNOSIS — U07.1 COVID: Primary | ICD-10-CM

## 2023-08-24 DIAGNOSIS — R50.9 FEVER, UNSPECIFIED FEVER CAUSE: ICD-10-CM

## 2023-08-24 LAB
BASOPHILS # BLD AUTO: 0.04 10*3/MM3 (ref 0–0.2)
BASOPHILS NFR BLD AUTO: 0.2 % (ref 0–1.5)
DEPRECATED RDW RBC AUTO: 42.4 FL (ref 37–54)
EOSINOPHIL # BLD AUTO: 0.03 10*3/MM3 (ref 0–0.4)
EOSINOPHIL NFR BLD AUTO: 0.2 % (ref 0.3–6.2)
ERYTHROCYTE [DISTWIDTH] IN BLOOD BY AUTOMATED COUNT: 13.4 % (ref 12.3–15.4)
HCT VFR BLD AUTO: 47.5 % (ref 37.5–51)
HGB BLD-MCNC: 15.9 G/DL (ref 13–17.7)
IMM GRANULOCYTES # BLD AUTO: 0.05 10*3/MM3 (ref 0–0.05)
IMM GRANULOCYTES NFR BLD AUTO: 0.3 % (ref 0–0.5)
LYMPHOCYTES # BLD AUTO: 1.02 10*3/MM3 (ref 0.7–3.1)
LYMPHOCYTES NFR BLD AUTO: 6.1 % (ref 19.6–45.3)
MCH RBC QN AUTO: 29.2 PG (ref 26.6–33)
MCHC RBC AUTO-ENTMCNC: 33.5 G/DL (ref 31.5–35.7)
MCV RBC AUTO: 87.2 FL (ref 79–97)
MONOCYTES # BLD AUTO: 1.57 10*3/MM3 (ref 0.1–0.9)
MONOCYTES NFR BLD AUTO: 9.5 % (ref 5–12)
NEUTROPHILS NFR BLD AUTO: 13.9 10*3/MM3 (ref 1.7–7)
NEUTROPHILS NFR BLD AUTO: 83.7 % (ref 42.7–76)
NRBC BLD AUTO-RTO: 0 /100 WBC (ref 0–0.2)
PLATELET # BLD AUTO: 208 10*3/MM3 (ref 140–450)
PMV BLD AUTO: 10.8 FL (ref 6–12)
RBC # BLD AUTO: 5.45 10*6/MM3 (ref 4.14–5.8)
WBC NRBC COR # BLD: 16.61 10*3/MM3 (ref 3.4–10.8)

## 2023-08-24 PROCEDURE — 99214 OFFICE O/P EST MOD 30 MIN: CPT | Performed by: NURSE PRACTITIONER

## 2023-08-24 PROCEDURE — 36415 COLL VENOUS BLD VENIPUNCTURE: CPT | Performed by: NURSE PRACTITIONER

## 2023-08-24 PROCEDURE — 85025 COMPLETE CBC W/AUTO DIFF WBC: CPT | Performed by: NURSE PRACTITIONER

## 2023-08-24 RX ORDER — AMOXICILLIN AND CLAVULANATE POTASSIUM 875; 125 MG/1; MG/1
1 TABLET, FILM COATED ORAL 2 TIMES DAILY
Qty: 14 TABLET | Refills: 0 | Status: SHIPPED | OUTPATIENT
Start: 2023-08-24 | End: 2023-08-24 | Stop reason: SDUPTHER

## 2023-08-24 RX ORDER — AMOXICILLIN AND CLAVULANATE POTASSIUM 875; 125 MG/1; MG/1
1 TABLET, FILM COATED ORAL 2 TIMES DAILY
Qty: 14 TABLET | Refills: 0 | Status: SHIPPED | OUTPATIENT
Start: 2023-08-24

## 2023-08-24 NOTE — PROGRESS NOTES
"Chief Complaint  Fever, Chills, and Headache    Subjective        Vinnie Ventura presents to [facility]  History of Present Illness  Pleasant patient complains of fever 101+, started Monday, without cough congestion sore throat,  A little head pressure slight headache at times in the frontal but no severe headache no persistent headache neck pain or photophobia  He has an upset stomach more last couple days some mild cramping mostly just a little loose stool queasy  No focal pain or tenderness no severe spasms or cramps.    He has no new rash  No urination issues no dysuria frequency urgency is no flank pain chest pain or any severe abdominal pain.  He is keeping fluids down.  He is taking anti-inflammatories for fever.  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    Tuesday he had a slight possible  Positive COVID very faint was not sure but he is repeated and has been negative  SARS was negative today for COVID and flu        Fever   This is a new problem. The current episode started in the past 7 days. The problem occurs 2 to 4 times per day. The problem has been waxing and waning. The maximum temperature noted was 102 to 102.9 F. The temperature was taken using an oral thermometer. Associated symptoms include diarrhea, headaches, muscle aches and nausea.   Chills  Associated symptoms include chills, a fever, headaches and nausea.   Headache    Objective   Vital Signs:  /60   Pulse 110   Temp 97.1 øF (36.2 øC) (Infrared)   Resp 18   Ht 182.9 cm (72\")   Wt 133 kg (293 lb 11.2 oz)   SpO2 95%   BMI 39.83 kg/mý   Estimated body mass index is 39.83 kg/mý as calculated from the following:    Height as of this encounter: 182.9 cm (72\").    Weight as of this encounter: 133 kg (293 lb 11.2 oz).          Physical Exam  Vitals reviewed.   Constitutional:  "      General: He is not in acute distress.     Appearance: Normal appearance. He is well-developed. He is not ill-appearing, toxic-appearing or diaphoretic.      Comments: Pleasant no distress   HENT:      Head: Normocephalic.      Nose: Nose normal.      Mouth/Throat:      Mouth: Mucous membranes are moist.      Comments: Pharynx clear without erythema no petechiae speech clear  Eyes:      General: No scleral icterus.     Conjunctiva/sclera: Conjunctivae normal.      Pupils: Pupils are equal, round, and reactive to light.   Neck:      Thyroid: No thyromegaly.      Vascular: No JVD.      Comments: Neck is supple no cervical adenopathy, no rigidity.  Cardiovascular:      Rate and Rhythm: Normal rate and regular rhythm.      Heart sounds: Normal heart sounds. No murmur heard.    No friction rub. No gallop.   Pulmonary:      Effort: Pulmonary effort is normal. No respiratory distress.      Breath sounds: Normal breath sounds. No stridor. No wheezing or rales.   Abdominal:      General: Bowel sounds are normal. There is no distension.      Palpations: Abdomen is soft.      Tenderness: There is no abdominal tenderness.      Comments: No hepatosplenomegaly, no ascites,   listened back okay nontender no hepatosplenomegaly     Musculoskeletal:         General: No tenderness.      Cervical back: Neck supple. No rigidity.   Lymphadenopathy:      Cervical: No cervical adenopathy.   Skin:     General: Skin is warm and dry.      Findings: No erythema or rash.      Comments: No rash   Neurological:      General: No focal deficit present.      Mental Status: He is alert and oriented to person, place, and time. Mental status is at baseline.      Deep Tendon Reflexes: Reflexes are normal and symmetric.   Psychiatric:         Mood and Affect: Mood normal.         Behavior: Behavior normal.         Thought Content: Thought content normal.         Judgment: Judgment normal.      Result Review :                Assessment and Plan    Diagnoses and all orders for this visit:    1. COVID (Primary)    2. Fever, unspecified fever cause  -     CBC & Differential  -     Urinalysis With Culture If Indicated -    Other orders  -     Nirmatrelvir&Ritonavir 300/100 (PAXLOVID) 20 x 150 MG & 10 x 100MG tablet therapy pack tablet; Take 3 tablets by mouth 2 (Two) Times a Day.  Dispense: 30 tablet; Refill: 0             Follow Up   No follow-ups on file.  Patient was given instructions and counseling regarding his condition or for health maintenance advice. Please see specific information pulled into the AVS if appropriate.     Patient Instructions   Discharge instructions, you have a febrile illness suspicious for viral syndrome, possibly COVID, as you had a faint positive the second day although this was not reproduced  Reasonable to start Paxlovid  Push fluids  Avoid the public for 5 days from onset until without fever or fever equipment for at least 24 hours please     Continue to push fluids fluids fluids if you have frequent stools loose stools and fluids with electrolytes avoid excessive sugar which will make loose stool worse,    Pepto-Bismol as needed  For upset stomach      Ibuprofen 800 mg 3 times a day until better take with food and water if prone to acid reflux take the Pepcid  For breakthrough aches and pains and fever take Tylenol on top of ibuprofen as needed    Fluids fluids fluids, update your condition on Monday  Should you have higher fever  Severe or protracted headache nausea vomiting increased abdominal pain lethargy weakness  Worsening symptoms a low threshold for reevaluation and recheck as there is some uncertainty with your diagnosis we want to watch you closely make sure you recover promptly  Increased chest pain shortness of breath high fevers likely pneumonia and seek immediate treatment                 Answers submitted by the patient for this visit:  Primary Reason for Visit (Submitted on 8/23/2023)  What is the primary  reason for your visit?: Fever

## 2023-08-24 NOTE — PATIENT INSTRUCTIONS
"Discharge instructions, you have a febrile illness suspicious for viral syndrome, possibly COVID, as you had a faint positive the second day although this was not reproduced  Reasonable to start Paxlovid  Push fluids  Avoid the public for 5 days from onset until without fever or fever equipment for at least 24 hours please     Continue to push fluids fluids fluids if you have frequent stools loose stools and fluids with electrolytes avoid excessive sugar which will make loose stool worse,    Pepto-Bismol as needed  For upset stomach      Ibuprofen 800 mg 3 times a day until better take with food and water if prone to acid reflux take the Pepcid  For breakthrough aches and pains and fever take Tylenol on top of ibuprofen as needed    Fluids fluids fluids, update your condition on Monday  Should you have higher fever  Severe or protracted headache nausea vomiting increased abdominal pain lethargy weakness  Worsening symptoms a low threshold for reevaluation and recheck as there is some uncertainty with your diagnosis we want to watch you closely make sure you recover promptly  Increased chest pain shortness of breath high fevers likely pneumonia and seek immediate treatment  Probably close to 27 years Taryn came back percent on    PTOkay call \"what is it when you return or 15 years     if    "

## 2023-08-25 ENCOUNTER — TELEPHONE (OUTPATIENT)
Dept: FAMILY MEDICINE CLINIC | Facility: CLINIC | Age: 53
End: 2023-08-25

## 2023-08-25 NOTE — TELEPHONE ENCOUNTER
I spoke to patient, quick response with the Augmentin felling very much better he has no focus of infection  Fevers, does good  He will finish the Augmentin let me know if he needs anything next week and stay hydrated

## 2023-08-25 NOTE — TELEPHONE ENCOUNTER
"  Caller: Vinnie Ventura \"Derek\"    Relationship: Self    Best call back number:     What is the best time to reach you:     Who are you requesting to speak with (clinical staff, provider,  specific staff member):     Do you know the name of the person who called:     What was the call regarding: PATIENT IS CALLING IN STATING THAT HE WAS IN THE OFFICE YESTERDAY AND WANTS DR LO TO KNOW THAT THE AUGMENTIN SEEMS TO BE WORKING FOR HIM  HE CAN BE CALLED BACK WITH ANY QUESTIONS OR CONCERNS    Is it okay if the provider responds through MyChart:         "

## 2023-08-28 DIAGNOSIS — G47.33 OBSTRUCTIVE SLEEP APNEA: Primary | ICD-10-CM

## 2023-08-29 ENCOUNTER — TELEPHONE (OUTPATIENT)
Dept: SLEEP MEDICINE | Facility: HOSPITAL | Age: 53
End: 2023-08-29
Payer: COMMERCIAL

## 2023-08-29 NOTE — TELEPHONE ENCOUNTER
Called Pt and let him know we changed his pressure on his CPAP machine to 8-12 cm per modem . Pts DME  is [place], but Pt gets supplies on line

## 2023-10-20 DIAGNOSIS — Z12.2 SCREENING FOR MALIGNANT NEOPLASM OF RESPIRATORY ORGAN: Primary | ICD-10-CM

## 2023-10-20 DIAGNOSIS — Z87.891 STOPPED SMOKING WITH GREATER THAN 20 PACK YEAR HISTORY: ICD-10-CM

## 2023-10-20 NOTE — PROGRESS NOTES
Patient is interested in participating in our Lung Cancer Screening Day event and is scheduled to be screened on 11/11/2023 at 0845.  He plans to complete his required shared decision making visit on October 26th. He is a former smoker quitting 12 years ago with a 24.5 pack year history.  He reports smoking 1.5 - 2 ppd for 14 years for a 24.5 pack year history. He should be screened annually until 15 years past smoking.

## 2023-10-26 ENCOUNTER — DOCUMENTATION (OUTPATIENT)
Dept: OTHER | Facility: HOSPITAL | Age: 53
End: 2023-10-26
Payer: COMMERCIAL

## 2023-10-26 NOTE — PROGRESS NOTES
[facility] Low-Dose Lung Cancer CT Screening Visit    Vinnie Ventura is a pleasant 53 y.o. male seen today at his request in our Lung Cancer Screening Program, being seen for Lung Cancer Screening Counseling and a Shared Decision Making Visit prior to Low-Dose Lung Cancer Screening CT exam.  He is interested in getting his recommended lung cancer screening on 2023 during our National Lung Cancer Screening event.    SMOKING HISTORY:   Social History     Tobacco Use   Smoking Status Former    Packs/day: 1.75    Years: 14.00    Additional pack years: 0.00    Total pack years: 24.50    Types: Cigarettes    Start date: 1998    Quit date: 2010    Years since quittin.4   Smokeless Tobacco Never   Tobacco Comments    Former smoker quit 13 years ago with a 24.5 pack year history.     Current Height is 6 Feet and Current Weight is 288.4 Pounds.    Vinnie Ventura is a former smoker quitting 13 years ago with a 24.5 pack year history.  Reports no use of alternate forms of tobacco, electronic cigarettes, marijuana or other substances.  Based on the recommendation of the [place] Preventive Services Task Force, this patient is at high risk for lung cancer development and a low-dose CT screening scan is recommended.     We discussed the connection between Radon and Lung Cancer and the availability of test kits.  The patient lives [place] walk-out type basement with frequent opening of the garage door.  The patient reports no known occupational exposure as he has worked in  and as a hospital tech.  No second-hand smoke exposure as a child.  His parents did not smoke. No current second-hand exposure.    The patient has had no hemoptysis, unintentional weight loss or increasing shortness of breath. The patient is asymptomatic and has no signs or symptoms of lung cancer.   The patient reports no personal history of cancer.  Additionally, reports family history of lung cancer in a  maternal first cousin and has two sisters with a history of breast cancer. He has sleep apnea and has a CPAP device at home that he is not using due to non-optimal fitting of device.  He has had a cardiac ablation in the past for SVT, GERD and hypertension.  We reviewed a past CT angio of chest which was normal with a mention of fatty liver of which he is aware.    Together we discussed the potential benefits and potential harms of being screened for lung cancer including the potential for follow-up diagnostic testing, risk for over diagnosis, false positive rate and total radiation exposure using the [facility] standardized decision aid.  We reviewed the patient's smoking history and counseled on the importance and health benefits of maintaining cigarette smoking abstinence.      Smoking Abstinence  DISCUSSION HELD TODAY:     We discussed that there are a number of resources and tobacco cessation interventions to assist with smoking cessation including the 1-800-Quit Now line, Health Department programs, [place] Cancer Program resources, and use of the U.[place]. Department of Health and Human Services five keys for quitting and quit plan worksheet. On a scale of zero to ten, the patient rates their motivation to stay quit at a 0 out of 10 today.  The patient is confident that they will never smoke in the future.      Recommendations for continued lung cancer screening:      We discussed the NCCN guidelines for lung cancer screening and the patient verbalized understanding that annual screening is recommended until fifteen years beyond smoking as long as they have no other disease or comorbidity that would prevent them from receiving cancer treatments such as surgery should a lung cancer be detected. The [facility] Lung Cancer Screening Shared Decision-Making Tool was utilized as an aid in discussing whether or not screening was right. The patient has decided to proceed with a Low Dose Lung Cancer  Screening CT on November 11, 2023. The patient is aware that the results of his screening will be shared with his PCP as well.       The patient verbalizes understanding that results of this low dose lung CT exam will be called and that assistance will be provided in arranging any necessary follow-up.    After review of the NCCN guidelines and recommendations for ongoing screening, the patient verbalized understanding of recommendations for follow-up. We discussed the importance of adherence to continued annual screening until 15 years beyond smoking or until other life-limiting comorbidities are present. We discussed the impact of comorbidities and ability and willing to undergo diagnosis and treatment.    The patient was counseled on the continued health benefits of having quit tobacco, maintaining smoking abstinence, smoking cessation strategies and resources, as well as the importance of adherence to annual lung cancer low-dose CT screening.    Irish Lewis, MSN, APRN, ACNS-BC, AOCN, CHPN  Clinical Nurse Specialist  [facility]

## 2023-11-11 ENCOUNTER — HOSPITAL ENCOUNTER (OUTPATIENT)
Dept: PET IMAGING | Facility: HOSPITAL | Age: 53
Discharge: HOME | End: 2023-11-11
Admitting: CLINICAL NURSE SPECIALIST
Payer: COMMERCIAL

## 2023-11-11 DIAGNOSIS — Z87.891 STOPPED SMOKING WITH GREATER THAN 20 PACK YEAR HISTORY: ICD-10-CM

## 2023-11-11 DIAGNOSIS — Z12.2 SCREENING FOR MALIGNANT NEOPLASM OF RESPIRATORY ORGAN: ICD-10-CM

## 2023-11-11 PROCEDURE — 71271 CT THORAX LUNG CANCER SCR C-: CPT

## 2023-11-13 NOTE — PROGRESS NOTES
I have called Derek in follow-up.  He has reviewed his results on My Chart.  Nothing concerning for lung cancer.  He is concerned about the report of coronary artery calcifications.  He has had an ablation in the past.  He is asymptomatic.  We did discuss that it is not unusual to see coronary artery calcifications sooner with a history of smoking.  We discussed healthy lifestyle recommendations of consuming a plant based diet with limited animal fat, weight management, exercise and staying away from smoking.  He plans to discuss with his PCP at his next visit.  He verbalizes understanding that his next low-dose lung cancer screening CT is due in 12 months and that he should continue annual screening until 15 years past smoking.

## 2023-12-13 RX ORDER — LISINOPRIL 10 MG/1
10 TABLET ORAL DAILY
Qty: 90 TABLET | Refills: 1 | Status: SHIPPED | OUTPATIENT
Start: 2023-12-13

## 2023-12-13 RX ORDER — OMEPRAZOLE 20 MG/1
20 CAPSULE, DELAYED RELEASE ORAL DAILY
Qty: 90 CAPSULE | Refills: 1 | Status: SHIPPED | OUTPATIENT
Start: 2023-12-13

## 2023-12-13 NOTE — TELEPHONE ENCOUNTER
Rx Refill Note  Requested Prescriptions     Pending Prescriptions Disp Refills    lisinopril (PRINIVIL,ZESTRIL) 10 MG tablet 90 tablet 1     Sig: Take 1 tablet by mouth Daily.    omeprazole (priLOSEC) 20 MG capsule 90 capsule 1     Sig: Take 1 capsule by mouth Daily.      Last office visit with prescribing clinician: 8/21/2023   Last telemedicine visit with prescribing clinician: Visit date not found   Next office visit with prescribing clinician: 2/26/2024                         Would you like a call back once the refill request has been completed: [] Yes [] No    If the office needs to give you a call back, can they leave a voicemail: [] Yes [] No    Bernabe Frias  12/13/23, 09:16 EST

## 2024-02-26 ENCOUNTER — OFFICE VISIT (OUTPATIENT)
Dept: FAMILY MEDICINE CLINIC | Facility: CLINIC | Age: 54
End: 2024-02-26
Payer: COMMERCIAL

## 2024-02-26 VITALS
WEIGHT: 287 LBS | OXYGEN SATURATION: 96 % | SYSTOLIC BLOOD PRESSURE: 140 MMHG | DIASTOLIC BLOOD PRESSURE: 88 MMHG | HEART RATE: 79 BPM | BODY MASS INDEX: 38.87 KG/M2 | TEMPERATURE: 98.6 F | HEIGHT: 72 IN

## 2024-02-26 DIAGNOSIS — I10 ESSENTIAL HYPERTENSION: Primary | ICD-10-CM

## 2024-02-26 DIAGNOSIS — Z00.00 HEALTH CARE MAINTENANCE: ICD-10-CM

## 2024-02-26 DIAGNOSIS — Z12.5 SCREENING PSA (PROSTATE SPECIFIC ANTIGEN): ICD-10-CM

## 2024-02-26 NOTE — PROGRESS NOTES
"Answers submitted by the patient for this visit:  Primary Reason for Visit (Submitted on 2/19/2024)  What is the primary reason for your visit?: High Blood Pressure  Chief Complaint  Hypertension (Essential Hypertension)    Subjective        Vinnie Ventura presents to [facility]  Hypertension        Follow-up hypertension.  He is on a great job with continuing improving his diet and with some gradual weight loss.  His BMI is now less than 40.  He is being careful with his diet.  He has been working many hours and any trouble finding time to exercise.  He continues on lisinopril 10 mg daily.  His creatinine is normal.  Liver enzymes are improved.  His LDL cholesterol is improved.  Previous impaired fasting glucose resolved.  He has been checking his blood pressure periodically.  Running mostly normal.  No other concerns today.    Objective   Vital Signs:  /88 (BP Location: Left arm, Patient Position: Sitting, Cuff Size: Adult)   Pulse 79   Temp 98.6 °F (37 °C)   Ht 182.9 cm (72\")   Wt 130 kg (287 lb)   SpO2 96%   BMI 38.92 kg/m²   Estimated body mass index is 38.92 kg/m² as calculated from the following:    Height as of this encounter: 182.9 cm (72\").    Weight as of this encounter: 130 kg (287 lb).             Physical Exam  Vitals and nursing note reviewed.   Constitutional:       General: He is not in acute distress.     Appearance: He is well-developed.   Cardiovascular:      Rate and Rhythm: Normal rate and regular rhythm.      Heart sounds: Normal heart sounds.   Pulmonary:      Effort: Pulmonary effort is normal.      Breath sounds: Normal breath sounds.   Skin:     General: Skin is warm and dry.   Psychiatric:         Mood and Affect: Mood normal.        Result Review :                     Assessment and Plan     Diagnoses and all orders for this visit:    1. Essential hypertension (Primary)  -     Hemoglobin A1c; Future  -     CBC & Differential; Future  -     " Comprehensive Metabolic Panel; Future  -     Lipid Panel; Future  -     Urinalysis With Microscopic If Indicated (No Culture) - Urine, Clean Catch; Future    2. Health care maintenance  -     Hemoglobin A1c; Future  -     CBC & Differential; Future  -     Comprehensive Metabolic Panel; Future  -     Lipid Panel; Future  -     PSA Screen; Future  -     Urinalysis With Microscopic If Indicated (No Culture) - Urine, Clean Catch; Future    3. Screening PSA (prostate specific antigen)  -     PSA Screen; Future      Hypertension.  Likely well-controlled.  Continue lisinopril 10 mg a day.  I will see him back in 6 months for annual complete physical examination with lab work prior.  We discussed exercise counseling today.  He has been up pretty good job with his diet and continues to lose weight.  His liver enzymes have normalized.  His fasting glucose has normalized.  And his LDL cholesterol is improved.  Triglycerides remain mildly elevated.  Exercise is missing.         Follow Up     No follow-ups on file.  Patient was given instructions and counseling regarding his condition or for health maintenance advice. Please see specific information pulled into the AVS if appropriate.

## 2024-06-11 RX ORDER — OMEPRAZOLE 20 MG/1
20 CAPSULE, DELAYED RELEASE ORAL DAILY
Qty: 90 CAPSULE | Refills: 1 | Status: SHIPPED | OUTPATIENT
Start: 2024-06-11

## 2024-06-11 RX ORDER — LISINOPRIL 10 MG/1
10 TABLET ORAL DAILY
Qty: 90 TABLET | Refills: 1 | Status: SHIPPED | OUTPATIENT
Start: 2024-06-11

## 2024-06-11 NOTE — TELEPHONE ENCOUNTER
Rx Refill Note  Requested Prescriptions     Pending Prescriptions Disp Refills    lisinopril (PRINIVIL,ZESTRIL) 10 MG tablet 90 tablet 1     Sig: Take 1 tablet by mouth Daily.    omeprazole (priLOSEC) 20 MG capsule 90 capsule 1     Sig: Take 1 capsule by mouth Daily.      Last office visit with prescribing clinician: 2/26/2024   Last telemedicine visit with prescribing clinician: Visit date not found   Next office visit with prescribing clinician: 8/27/2024                         Would you like a call back once the refill request has been completed: [] Yes [] No    If the office needs to give you a call back, can they leave a voicemail: [] Yes [] No    Bernabe Frias  06/11/24, 10:35 EDT

## 2024-12-02 RX ORDER — LISINOPRIL 10 MG/1
10 TABLET ORAL DAILY
Qty: 90 TABLET | Refills: 1 | Status: SHIPPED | OUTPATIENT
Start: 2024-12-02

## 2024-12-02 NOTE — TELEPHONE ENCOUNTER
Rx Refill Note  Requested Prescriptions     Pending Prescriptions Disp Refills    lisinopril (PRINIVIL,ZESTRIL) 10 MG tablet 90 tablet 1     Sig: Take 1 tablet by mouth Daily.    omeprazole (priLOSEC) 20 MG capsule 90 capsule 1     Sig: Take 1 capsule by mouth Daily.      Last office visit with prescribing clinician: 8/24/2023   Last telemedicine visit with prescribing clinician: Visit date not found   Next office visit with prescribing clinician: Visit date not found                         Would you like a call back once the refill request has been completed: [] Yes [] No    If the office needs to give you a call back, can they leave a voicemail: [] Yes [] No    Stefanie Castelan MA  12/02/24, 16:40 EST

## (undated) DEVICE — GAUZE,SPONGE,FLUFF,6"X6.75",STRL,10/TRAY: Brand: MEDLINE

## (undated) DEVICE — DISPOSABLE TOURNIQUET CUFF SINGLE BLADDER, SINGLE PORT AND QUICK CONNECT CONNECTOR: Brand: COLOR CUFF

## (undated) DEVICE — SPNG LAP 18X18IN LF STRL PK/5

## (undated) DEVICE — ELECTRD NDL EZ CLN MOD 2.75IN

## (undated) DEVICE — PAD,ABDOMINAL,8"X10",ST,LF: Brand: MEDLINE

## (undated) DEVICE — IMPLANTABLE DEVICE
Type: IMPLANTABLE DEVICE | Site: ANKLE | Status: NON-FUNCTIONAL
Removed: 2021-10-22

## (undated) DEVICE — DRP C/ARM 41X74IN

## (undated) DEVICE — TRAP FLD MINIVAC MEGADYNE 100ML

## (undated) DEVICE — BIT DRL TI 2.5MM

## (undated) DEVICE — GLV SURG BIOGEL LTX PF 8

## (undated) DEVICE — SUT VIC 2/0 SH 27IN

## (undated) DEVICE — DRSNG GZ PETROLTM XEROFORM CURAD 1X8IN STRL

## (undated) DEVICE — Device

## (undated) DEVICE — BNDG ELAS ELITE V/CLOSE 4IN 5YD LF STRL

## (undated) DEVICE — PATIENT RETURN ELECTRODE, SINGLE-USE, CONTACT QUALITY MONITORING, ADULT, WITH 9FT CORD, FOR PATIENTS WEIGING OVER 33LBS. (15KG): Brand: MEGADYNE

## (undated) DEVICE — STPLR SKIN VISISTAT WD 35CT

## (undated) DEVICE — PREP SOL POVIDONE/IODINE BT 4OZ

## (undated) DEVICE — SPLNT ORTHOGLASS 4INX15FT

## (undated) DEVICE — DRP C/ARMOR

## (undated) DEVICE — PIN FIX BB TAK THRD

## (undated) DEVICE — PENCL E/S ULTRAVAC TELESCP NOSE HOLSTR 10FT

## (undated) DEVICE — BIT DRL 2.7MM

## (undated) DEVICE — SKIN PREP TRAY W/CHG: Brand: MEDLINE INDUSTRIES, INC.

## (undated) DEVICE — GLV SURG SIGNATURE ESSENTIAL PF LTX SZ8

## (undated) DEVICE — PK ORTHO MINOR TOWER 40

## (undated) DEVICE — KT INST FOR FIBERTAKDX SUT/ANCH W/GW 1.6MM 1.5MM DISP

## (undated) DEVICE — TOWEL,OR,DSP,ST,BLUE,STD,4/PK,20PK/CS: Brand: MEDLINE

## (undated) DEVICE — GOWN,NON-REINFORCED,SIRUS,SET IN SLV,XXL: Brand: MEDLINE

## (undated) DEVICE — APPL CHLORAPREP HI/LITE 26ML ORNG

## (undated) DEVICE — UNDERCAST PADDING: Brand: DEROYAL

## (undated) DEVICE — COVER,LIGHT HANDLE,FLX,2/PK: Brand: MEDLINE INDUSTRIES, INC.

## (undated) DEVICE — SUT VIC 3/0 SH 27IN J416H